# Patient Record
Sex: MALE | Race: WHITE | NOT HISPANIC OR LATINO | Employment: FULL TIME | URBAN - METROPOLITAN AREA
[De-identification: names, ages, dates, MRNs, and addresses within clinical notes are randomized per-mention and may not be internally consistent; named-entity substitution may affect disease eponyms.]

---

## 2020-07-25 ENCOUNTER — HOSPITAL ENCOUNTER (OUTPATIENT)
Dept: URGENT CARE | Facility: CLINIC | Age: 45
Discharge: HOME | End: 2020-07-25
Attending: PHYSICIAN ASSISTANT

## 2020-09-02 ENCOUNTER — OFFICE VISIT CONVERTED (OUTPATIENT)
Dept: FAMILY MEDICINE CLINIC | Facility: CLINIC | Age: 45
End: 2020-09-02
Attending: NURSE PRACTITIONER

## 2021-03-03 ENCOUNTER — HOSPITAL ENCOUNTER (OUTPATIENT)
Dept: FAMILY MEDICINE CLINIC | Facility: CLINIC | Age: 46
Discharge: HOME | End: 2021-03-03
Attending: NURSE PRACTITIONER

## 2021-03-03 ENCOUNTER — CONVERSION ENCOUNTER (OUTPATIENT)
Dept: FAMILY MEDICINE CLINIC | Facility: CLINIC | Age: 46
End: 2021-03-03

## 2021-03-03 ENCOUNTER — OFFICE VISIT CONVERTED (OUTPATIENT)
Dept: FAMILY MEDICINE CLINIC | Facility: CLINIC | Age: 46
End: 2021-03-03
Attending: NURSE PRACTITIONER

## 2021-03-03 LAB
ALBUMIN SERPL-MCNC: 4.5 G/DL (ref 3.5–5)
ALBUMIN/GLOB SERPL: 1.5 {RATIO} (ref 1.4–2.6)
ALP SERPL-CCNC: 62 U/L (ref 53–128)
ALT SERPL-CCNC: 20 U/L (ref 10–40)
ANION GAP SERPL CALC-SCNC: 17 MMOL/L (ref 8–19)
AST SERPL-CCNC: 22 U/L (ref 15–50)
BASOPHILS # BLD AUTO: 0.06 10*3/UL (ref 0–0.2)
BASOPHILS NFR BLD AUTO: 0.8 % (ref 0–3)
BILIRUB SERPL-MCNC: 0.38 MG/DL (ref 0.2–1.3)
BUN SERPL-MCNC: 11 MG/DL (ref 5–25)
BUN/CREAT SERPL: 9 {RATIO} (ref 6–20)
CALCIUM SERPL-MCNC: 9.6 MG/DL (ref 8.7–10.4)
CHLORIDE SERPL-SCNC: 102 MMOL/L (ref 99–111)
CHOLEST SERPL-MCNC: 328 MG/DL (ref 107–200)
CHOLEST/HDLC SERPL: 7.8 {RATIO} (ref 3–6)
CONV ABS IMM GRAN: 0.01 10*3/UL (ref 0–0.2)
CONV CO2: 26 MMOL/L (ref 22–32)
CONV IMMATURE GRAN: 0.1 % (ref 0–1.8)
CONV TOTAL PROTEIN: 7.6 G/DL (ref 6.3–8.2)
CREAT UR-MCNC: 1.17 MG/DL (ref 0.7–1.2)
DEPRECATED RDW RBC AUTO: 43.7 FL (ref 35.1–43.9)
EOSINOPHIL # BLD AUTO: 0.41 10*3/UL (ref 0–0.7)
EOSINOPHIL # BLD AUTO: 5.7 % (ref 0–7)
ERYTHROCYTE [DISTWIDTH] IN BLOOD BY AUTOMATED COUNT: 13.2 % (ref 11.6–14.4)
GFR SERPLBLD BASED ON 1.73 SQ M-ARVRAT: >60 ML/MIN/{1.73_M2}
GLOBULIN UR ELPH-MCNC: 3.1 G/DL (ref 2–3.5)
GLUCOSE SERPL-MCNC: 85 MG/DL (ref 70–99)
HCT VFR BLD AUTO: 48.3 % (ref 42–52)
HDLC SERPL-MCNC: 42 MG/DL (ref 40–60)
HGB BLD-MCNC: 16 G/DL (ref 14–18)
LDLC SERPL CALC-MCNC: 222 MG/DL (ref 70–100)
LYMPHOCYTES # BLD AUTO: 1.77 10*3/UL (ref 1–5)
LYMPHOCYTES NFR BLD AUTO: 24.7 % (ref 20–45)
MCH RBC QN AUTO: 30 PG (ref 27–31)
MCHC RBC AUTO-ENTMCNC: 33.1 G/DL (ref 33–37)
MCV RBC AUTO: 90.4 FL (ref 80–96)
MONOCYTES # BLD AUTO: 0.54 10*3/UL (ref 0.2–1.2)
MONOCYTES NFR BLD AUTO: 7.5 % (ref 3–10)
NEUTROPHILS # BLD AUTO: 4.39 10*3/UL (ref 2–8)
NEUTROPHILS NFR BLD AUTO: 61.2 % (ref 30–85)
NRBC CBCN: 0 % (ref 0–0.7)
OSMOLALITY SERPL CALC.SUM OF ELEC: 291 MOSM/KG (ref 273–304)
PLATELET # BLD AUTO: 250 10*3/UL (ref 130–400)
PMV BLD AUTO: 10 FL (ref 9.4–12.4)
POTASSIUM SERPL-SCNC: 4.4 MMOL/L (ref 3.5–5.3)
RBC # BLD AUTO: 5.34 10*6/UL (ref 4.7–6.1)
SODIUM SERPL-SCNC: 141 MMOL/L (ref 135–147)
T4 FREE SERPL-MCNC: 1.1 NG/DL (ref 0.9–1.8)
TRIGL SERPL-MCNC: 321 MG/DL (ref 40–150)
TSH SERPL-ACNC: 1.11 M[IU]/L (ref 0.27–4.2)
VLDLC SERPL-MCNC: 64 MG/DL (ref 5–37)
WBC # BLD AUTO: 7.18 10*3/UL (ref 4.8–10.8)

## 2021-05-10 NOTE — H&P
History and Physical      Patient Name: Subhash Barba   Patient ID: [id number]   Sex: Male   YOB: 1975        Visit Date: September 2, 2020    Provider: WENDY Burks   [place] [facility] [place] [address] [place] [address], [address]  [place], [place]  [id number]   Location Phone: [phone]          Chief Complaint  · establish care, new patient  · medication refills      History Of Present Illness  Subhash Barba is a 45 year old /White male who presents for evaluation and treatment of:      As a new patient today to establish care.  He states his previous provider recently retired.    History of anxiety: Stable on Zoloft 100 mg.  He states that he has been on this a long time and it works well for him.    History of GERD: States he is stable on over-the-counter Nexium.    History of asthma: He states he is well controlled and only use albuterol inhaler rarely as needed.    He states he was bit by a brown recluse spider about a month ago and was seen at the urgent care.  He states he was treated with an antibiotic and states that it has resolved.  He states he does still have a scar in the area but denies any pain or itching.    He states he has not had lab work for over a year now.  He states he does have a history of some elevated cholesterol but not requiring statin medication.  He is not fasting today but states he could come back another day for fasting labs.       Past Medical History  Disease Name Date Onset Notes   Allergies --  --    Anxiety --  --    Asthma --  --    Broken Bones --  --    Depression (emotion) --  --    GERD (gastroesophageal reflux disease) 09/02/2020 --    Head injuries --  --    High cholesterol --  --    Moderate mixed hyperlipidemia not requiring statin therapy 09/02/2020 --    Reflux Disease --  --          Medication List  Name Date Started Instructions   albuterol sulfate 90 mcg/actuation inhalation HFA aerosol inhaler   "inhale 1 - 2 puffs (90 - 180 mcg) by inhalation route every 6 hours as needed   Nexium 40 mg oral capsule,delayed release(DR/EC)  take 1 capsule (40 mg) by oral route once daily   Zoloft 100 mg oral tablet 09/02/2020 take 1 tablet (100 mg) by oral route once daily for 90 days         Family Medical History  Disease Name Relative/Age Notes   Diabetes Mother/   --          Social History  Finding Status Start/Stop Quantity Notes   Tobacco Former --/-- --  09/02/2020- quit in 2019, smoked for 10 years, 1/2 PPD         Review of Systems  · Constitutional  o Denies  o : fever, fatigue, weight loss, weight gain  · Cardiovascular  o Denies  o : lower extremity edema, claudication, chest pressure, palpitations  · Respiratory  o Denies  o : shortness of breath, wheezing, cough, hemoptysis, dyspnea on exertion  · Gastrointestinal  o Denies  o : nausea, vomiting, diarrhea, constipation, abdominal pain  · Genitourinary  o Denies  o : urgency, frequency  · Integument  o Denies  o : rash, itching  · Neurologic  o Denies  o : altered mental status, tingling or numbness  · Musculoskeletal  o Denies  o : joint pain, joint swelling  · Endocrine  o Denies  o : cold intolerance, central obesity  · Psychiatric  o Denies  o : anxiety, depression, difficulty sleeping, suicidal ideation, homicidal ideation  · Allergic-Immunologic  o Denies  o : sinus allergy symptoms, frequent illnesses      Vitals  Date Time BP Position Site L\R Cuff Size HR RR TEMP (F) WT  HT  BMI kg/m2 BSA m2 O2 Sat        09/02/2020 01:51 /82 Sitting    87 - R  97.8 200lbs 6oz 5'  8\" 30.47 2.09 93 %          Physical Examination  · Constitutional  o Appearance  o : no acute distress, well-nourished  · Head and Face  o Head  o :   § Inspection  § : atraumatic, normocephalic  · Neck  o Thyroid  o : gland size normal, nontender, no nodules or masses present on palpation, symmetric  · Respiratory  o Respiratory Effort  o : breathing comfortably, symmetric chest " rise  o Auscultation of Lungs  o : clear to asculatation bilaterally, no wheezes, rales, or rhonchii  · Cardiovascular  o Heart  o :   § Auscultation of Heart  § : regular rate and rhythm, no murmurs, rubs, or gallops  o Peripheral Vascular System  o :   § Extremities  § : no edema  · Lymphatic  o Neck  o : no lymphadenopathy present  · Skin and Subcutaneous Tissue  o General Inspection  o : Dark scar areas noted to right medial elbow, nontender on palpation.no rashes present, no lesions present  · Neurologic  o Mental Status Examination  o :   § Orientation  § : grossly oriented to person, place and time  o Gait and Station  o :   § Gait Screening  § : normal gait  · Psychiatric  o General  o : normal mood and affect  o Presence of Abnormal Thoughts  o : no hallucinations, no delusions present, no psychotic thoughts, no homicidal ideation, no suicidal ideation, no evidence of obsessional thinking          Assessment  · Anxiety disorder     300.00/F41.9  · GERD (gastroesophageal reflux disease)     530.81/K21.9  · Moderate mixed hyperlipidemia not requiring statin therapy     272.2/E78.2    Problems Reconciled  Plan  · Orders  o HTN/Lipid Panel (CMP, Lipid) [facility] (49786, [address]10) - 272.2/E78.2 - 09/08/2020   Patient will return for fasting labs next week  o CBC with Auto Diff [facility] (46122) - 272.2/E78.2, 300.00/F41.9, 530.81/K21.9 - 09/02/2020  o Thyroid Profile (90038, 56939, THYII) - 272.2/E78.2, 300.00/F41.9, 530.81/K21.9 - 09/02/2020  o ACO-39: Current medications updated and reviewed () - - 09/08/2020  o ACO-18: Negative screen for clinical depression using a standardized tool () - - 09/08/2020   negative 0 pts.  · Medications  o Zoloft 100 mg oral tablet   SIG: take 1 tablet (100 mg) by oral route once daily for 90 days   DISP: (90) tablet with 1 refills  Adjusted on 09/02/2020     o Medications have been Reconciled  o Transition of Care or Provider Policy  · Instructions  o Patient is taking medications  as prescribed and doing well.   o Patient was educated/instructed on their diagnosis, treatment and medications prior to discharge from the clinic today.  o Patient instructed to seek medical attention urgently for new or worsening symptoms.  o Call the office with any concerns or questions.  · Disposition  o Return to clinic in 6 months            Electronically Signed by: WENDY Burks -Author on September 2, 2020 02:13:17 PM

## 2021-05-14 VITALS
TEMPERATURE: 97.8 F | HEART RATE: 87 BPM | DIASTOLIC BLOOD PRESSURE: 82 MMHG | SYSTOLIC BLOOD PRESSURE: 124 MMHG | OXYGEN SATURATION: 93 % | HEIGHT: 68 IN | BODY MASS INDEX: 30.37 KG/M2 | WEIGHT: 200.37 LBS

## 2021-05-14 VITALS
HEART RATE: 80 BPM | DIASTOLIC BLOOD PRESSURE: 72 MMHG | HEIGHT: 68 IN | BODY MASS INDEX: 29.74 KG/M2 | TEMPERATURE: 98 F | SYSTOLIC BLOOD PRESSURE: 116 MMHG | OXYGEN SATURATION: 98 % | WEIGHT: 196.25 LBS

## 2021-05-14 NOTE — PROGRESS NOTES
Progress Note      Patient Name: Subhash Barba   Patient ID: [id number]   Sex: Male   YOB: 1975    Primary Care Provider: Marietta NGUYEN   Referring Provider: Marietta NGUYEN    Visit Date: March 3, 2021    Provider: WENDY Burks   [place] [facility] [place] Address[place] [address] [place] [address], [address] [place] [address]  [id number]   Location Phone: [phone]          Chief Complaint  · Annual physical  · Follow-up and med refills      History Of Present Illness  Subhash Barba is a 45 year old /White male who presents for evaluation and treatment of:      He is here for an annual physical, follow-up and med refills.    He states he is fasting today and wants to do some annual physical lab work.  He does have a history of mixed hyperlipidemia.    History of anxiety and depression: He is stable on Zoloft 100 mg once daily.    History of hiatal hernia with reflux: He has had an endoscopy in the past and was told he had a hiatal hernia.  He takes Nexium every day with good relief.    History of asthma: He is stable, states he rarely uses his albuterol inhaler, states usually only about twice per month.       Past Medical History  Disease Name Date Onset Notes   Allergies --  --    Anxiety --  --    Asthma --  --    Broken Bones --  --    Depression (emotion) --  --    GERD (gastroesophageal reflux disease) 09/02/2020 --    Head injuries --  --    High cholesterol --  --    Moderate mixed hyperlipidemia not requiring statin therapy 09/02/2020 --    Reflux Disease --  --          Medication List  Name Date Started Instructions   albuterol sulfate 90 mcg/actuation inhalation HFA aerosol inhaler  inhale 1 - 2 puffs (90 - 180 mcg) by inhalation route every 6 hours as needed   Nexium 40 mg oral capsule,delayed release(DR/EC)  take 1 capsule (40 mg) by oral route once daily   Zoloft 100 mg oral tablet 09/02/2020 take 1 tablet (100 mg) by oral route once  "daily for 90 days         Allergy List  Allergen Name Date Reaction Notes   NO KNOWN DRUG ALLERGIES --  --  --        Allergies Reconciled  Family Medical History  Disease Name Relative/Age Notes   Diabetes Mother/   --          Social History  Finding Status Start/Stop Quantity Notes   Tobacco Former --/-- --  09/02/2020- quit in 2019, smoked for 10 years, 1/2 PPD         Review of Systems  · Constitutional  o Denies  o : fever, fatigue, weight loss, weight gain  · Cardiovascular  o Denies  o : lower extremity edema, claudication, chest pressure, palpitations  · Respiratory  o Denies  o : shortness of breath, wheezing, cough, hemoptysis, dyspnea on exertion  · Gastrointestinal  o Admits  o : reflux  o Denies  o : nausea, vomiting, diarrhea, constipation, abdominal pain  · Genitourinary  o Denies  o : urgency, frequency  · Integument  o Denies  o : rash, itching  · Psychiatric  o Denies  o : anxiety, depression, suicidal ideation, homicidal ideation      Vitals  Date Time BP Position Site L\R Cuff Size HR RR TEMP (F) WT  HT  BMI kg/m2 BSA m2 O2 Sat FR L/min FiO2 HC       03/03/2021 10:02 /72 Sitting    80 - R  98 196lbs 4oz 5'  8\" 29.84 2.07 98 %            Physical Examination  · Constitutional  o Appearance  o : no acute distress, well-nourished  · Head and Face  o Head  o :   § Inspection  § : atraumatic, normocephalic  · Neck  o Thyroid  o : gland size normal, nontender, no nodules or masses present on palpation, symmetric  · Respiratory  o Respiratory Effort  o : breathing comfortably, symmetric chest rise  o Auscultation of Lungs  o : clear to asculatation bilaterally, no wheezes, rales, or rhonchii  · Cardiovascular  o Heart  o :   § Auscultation of Heart  § : regular rate and rhythm, no murmurs, rubs, or gallops  o Peripheral Vascular System  o :   § Extremities  § : no edema  · Lymphatic  o Neck  o : no lymphadenopathy present  · Neurologic  o Mental Status Examination  o :   § Orientation  § : " grossly oriented to person, place and time  o Gait and Station  o :   § Gait Screening  § : normal gait  · Psychiatric  o General  o : normal mood and affect  o Presence of Abnormal Thoughts  o : no hallucinations, no delusions present, no psychotic thoughts, no homicidal ideation, no suicidal ideation, no evidence of obsessional thinking              Assessment  · Annual physical exam     V70.0/Z00.00  We will check labs today, will call with results.  · Asthma     493.90/J45.909  Well-controlled, stable.  · Moderate mixed hyperlipidemia not requiring statin therapy     272.2/E78.2  · Anxiety     300.02/F41.1  Stable on Zoloft.  · Hiatal hernia with gastroesophageal reflux       Gastro-esophageal reflux disease without esophagitis     530.81/K21.9  Diaphragmatic hernia without obstruction or gangrene     530.81/K44.9  Stable on Nexium.      Plan  · Orders  o CBC with Auto Diff [facility] ([id number]) - 300.02/F41.1, 272.2/E78.2, V70.0/Z00.00 - 03/03/2021  o Thyroid Profile (61512, 06104, THYII) - 300.02/F41.1, 272.2/E78.2, V70.0/Z00.00 - 03/03/2021  o ACO-14: Influenza immunization was not administered for reasons documented [facility] () - - 03/03/2021   patient declines  o ACO-39: Current medications updated and reviewed (1159F, ) - - 03/03/2021  o HTN/Lipid Panel (CMP, Lipid) [facility] (49865, 37147) - 300.02/F41.1, 272.2/E78.2, V70.0/Z00.00 - 03/03/2021  · Medications  o Zoloft 100 mg oral tablet   SIG: take 1 tablet (100 mg) by oral route once daily for 90 days   DISP: (90) Tablet with 3 refills  Adjusted on 03/03/2021     o Medications have been Reconciled  o Transition of Care or Provider Policy  · Instructions  o Reviewed health maintenance flowsheet and updated information. Orders were placed and/or patient's response was documented.  o Patient was educated/instructed on their diagnosis, treatment and medications prior to discharge from the clinic today.  o Patient instructed to seek medical attention urgently for new or  worsening symptoms.  o Call the office with any concerns or questions.  · Disposition  o Return to clinic in 6 months            Electronically Signed by: WENDY Burks -Author on March 3, 2021 10:17:03 AM

## 2021-06-08 ENCOUNTER — OFFICE VISIT (OUTPATIENT)
Dept: FAMILY MEDICINE CLINIC | Facility: CLINIC | Age: 46
End: 2021-06-08

## 2021-06-08 VITALS
DIASTOLIC BLOOD PRESSURE: 80 MMHG | SYSTOLIC BLOOD PRESSURE: 116 MMHG | BODY MASS INDEX: 28.16 KG/M2 | HEIGHT: 68 IN | HEART RATE: 92 BPM | OXYGEN SATURATION: 98 % | TEMPERATURE: 98.6 F | WEIGHT: 185.8 LBS

## 2021-06-08 DIAGNOSIS — E78.2 MIXED HYPERLIPIDEMIA: ICD-10-CM

## 2021-06-08 DIAGNOSIS — N52.9 ERECTILE DYSFUNCTION, UNSPECIFIED ERECTILE DYSFUNCTION TYPE: ICD-10-CM

## 2021-06-08 DIAGNOSIS — R53.81 MALAISE AND FATIGUE: Primary | ICD-10-CM

## 2021-06-08 DIAGNOSIS — R79.89 LOW TESTOSTERONE IN MALE: ICD-10-CM

## 2021-06-08 DIAGNOSIS — G44.209 ACUTE NON INTRACTABLE TENSION-TYPE HEADACHE: ICD-10-CM

## 2021-06-08 DIAGNOSIS — R53.83 MALAISE AND FATIGUE: Primary | ICD-10-CM

## 2021-06-08 PROBLEM — K44.9 HIATAL HERNIA WITH GASTROESOPHAGEAL REFLUX: Status: ACTIVE | Noted: 2021-03-03

## 2021-06-08 PROBLEM — F41.9 ANXIETY: Status: ACTIVE | Noted: 2021-06-08

## 2021-06-08 PROBLEM — F32.A DEPRESSION: Status: ACTIVE | Noted: 2021-06-08

## 2021-06-08 PROBLEM — K21.9 HIATAL HERNIA WITH GASTROESOPHAGEAL REFLUX: Status: ACTIVE | Noted: 2021-03-03

## 2021-06-08 PROBLEM — J01.80 OTHER ACUTE SINUSITIS: Status: ACTIVE | Noted: 2021-06-08

## 2021-06-08 PROBLEM — J45.909 ASTHMA: Status: ACTIVE | Noted: 2021-06-08

## 2021-06-08 LAB
ALBUMIN SERPL-MCNC: 4.8 G/DL (ref 3.5–5.2)
ALBUMIN/GLOB SERPL: 1.6 G/DL
ALP SERPL-CCNC: 73 U/L (ref 39–117)
ALT SERPL W P-5'-P-CCNC: 19 U/L (ref 1–41)
ANION GAP SERPL CALCULATED.3IONS-SCNC: 12.7 MMOL/L (ref 5–15)
AST SERPL-CCNC: 18 U/L (ref 1–40)
BASOPHILS # BLD AUTO: 0.06 10*3/MM3 (ref 0–0.2)
BASOPHILS NFR BLD AUTO: 0.7 % (ref 0–1.5)
BILIRUB SERPL-MCNC: 0.5 MG/DL (ref 0–1.2)
BUN SERPL-MCNC: 8 MG/DL (ref 6–20)
BUN/CREAT SERPL: 8.1 (ref 7–25)
CALCIUM SPEC-SCNC: 9.9 MG/DL (ref 8.6–10.5)
CHLORIDE SERPL-SCNC: 101 MMOL/L (ref 98–107)
CHOLEST SERPL-MCNC: 181 MG/DL (ref 0–200)
CO2 SERPL-SCNC: 26.3 MMOL/L (ref 22–29)
CREAT SERPL-MCNC: 0.99 MG/DL (ref 0.76–1.27)
DEPRECATED RDW RBC AUTO: 44.3 FL (ref 37–54)
EOSINOPHIL # BLD AUTO: 0.48 10*3/MM3 (ref 0–0.4)
EOSINOPHIL NFR BLD AUTO: 5.7 % (ref 0.3–6.2)
ERYTHROCYTE [DISTWIDTH] IN BLOOD BY AUTOMATED COUNT: 13 % (ref 12.3–15.4)
FOLATE SERPL-MCNC: 10.4 NG/ML (ref 4.78–24.2)
GFR SERPL CREATININE-BSD FRML MDRD: 82 ML/MIN/1.73
GLOBULIN UR ELPH-MCNC: 3 GM/DL
GLUCOSE SERPL-MCNC: 78 MG/DL (ref 65–99)
HCT VFR BLD AUTO: 49.1 % (ref 37.5–51)
HDLC SERPL-MCNC: 48 MG/DL (ref 40–60)
HGB BLD-MCNC: 16.5 G/DL (ref 13–17.7)
IMM GRANULOCYTES # BLD AUTO: 0.02 10*3/MM3 (ref 0–0.05)
IMM GRANULOCYTES NFR BLD AUTO: 0.2 % (ref 0–0.5)
LDLC SERPL CALC-MCNC: 101 MG/DL (ref 0–100)
LDLC/HDLC SERPL: 2 {RATIO}
LYMPHOCYTES # BLD AUTO: 1.82 10*3/MM3 (ref 0.7–3.1)
LYMPHOCYTES NFR BLD AUTO: 21.8 % (ref 19.6–45.3)
MCH RBC QN AUTO: 31.1 PG (ref 26.6–33)
MCHC RBC AUTO-ENTMCNC: 33.6 G/DL (ref 31.5–35.7)
MCV RBC AUTO: 92.5 FL (ref 79–97)
MONOCYTES # BLD AUTO: 0.52 10*3/MM3 (ref 0.1–0.9)
MONOCYTES NFR BLD AUTO: 6.2 % (ref 5–12)
NEUTROPHILS NFR BLD AUTO: 5.45 10*3/MM3 (ref 1.7–7)
NEUTROPHILS NFR BLD AUTO: 65.4 % (ref 42.7–76)
NRBC BLD AUTO-RTO: 0 /100 WBC (ref 0–0.2)
PLATELET # BLD AUTO: 261 10*3/MM3 (ref 140–450)
PMV BLD AUTO: 10.1 FL (ref 6–12)
POTASSIUM SERPL-SCNC: 4.6 MMOL/L (ref 3.5–5.2)
PROT SERPL-MCNC: 7.8 G/DL (ref 6–8.5)
RBC # BLD AUTO: 5.31 10*6/MM3 (ref 4.14–5.8)
SODIUM SERPL-SCNC: 140 MMOL/L (ref 136–145)
T4 FREE SERPL-MCNC: 0.99 NG/DL (ref 0.93–1.7)
TRIGL SERPL-MCNC: 184 MG/DL (ref 0–150)
TSH SERPL DL<=0.05 MIU/L-ACNC: 0.94 UIU/ML (ref 0.27–4.2)
VIT B12 BLD-MCNC: 514 PG/ML (ref 211–946)
VLDLC SERPL-MCNC: 32 MG/DL (ref 5–40)
WBC # BLD AUTO: 8.35 10*3/MM3 (ref 3.4–10.8)

## 2021-06-08 PROCEDURE — 80053 COMPREHEN METABOLIC PANEL: CPT | Performed by: NURSE PRACTITIONER

## 2021-06-08 PROCEDURE — 84403 ASSAY OF TOTAL TESTOSTERONE: CPT | Performed by: NURSE PRACTITIONER

## 2021-06-08 PROCEDURE — 80061 LIPID PANEL: CPT | Performed by: NURSE PRACTITIONER

## 2021-06-08 PROCEDURE — 82607 VITAMIN B-12: CPT | Performed by: NURSE PRACTITIONER

## 2021-06-08 PROCEDURE — 84402 ASSAY OF FREE TESTOSTERONE: CPT | Performed by: NURSE PRACTITIONER

## 2021-06-08 PROCEDURE — 85025 COMPLETE CBC W/AUTO DIFF WBC: CPT | Performed by: NURSE PRACTITIONER

## 2021-06-08 PROCEDURE — 99214 OFFICE O/P EST MOD 30 MIN: CPT | Performed by: NURSE PRACTITIONER

## 2021-06-08 PROCEDURE — 96372 THER/PROPH/DIAG INJ SC/IM: CPT | Performed by: NURSE PRACTITIONER

## 2021-06-08 PROCEDURE — 84443 ASSAY THYROID STIM HORMONE: CPT | Performed by: NURSE PRACTITIONER

## 2021-06-08 PROCEDURE — 84439 ASSAY OF FREE THYROXINE: CPT | Performed by: NURSE PRACTITIONER

## 2021-06-08 PROCEDURE — 82746 ASSAY OF FOLIC ACID SERUM: CPT | Performed by: NURSE PRACTITIONER

## 2021-06-08 RX ORDER — KETOROLAC TROMETHAMINE 30 MG/ML
60 INJECTION, SOLUTION INTRAMUSCULAR; INTRAVENOUS ONCE
Status: COMPLETED | OUTPATIENT
Start: 2021-06-08 | End: 2021-06-08

## 2021-06-08 RX ORDER — ALBUTEROL SULFATE 90 UG/1
1-2 AEROSOL, METERED RESPIRATORY (INHALATION) EVERY 6 HOURS PRN
COMMUNITY
End: 2021-06-08 | Stop reason: SDUPTHER

## 2021-06-08 RX ORDER — ESOMEPRAZOLE MAGNESIUM 40 MG/1
1 CAPSULE, DELAYED RELEASE ORAL DAILY
COMMUNITY
End: 2021-09-22 | Stop reason: SDUPTHER

## 2021-06-08 RX ORDER — KETOROLAC TROMETHAMINE 30 MG/ML
60 INJECTION, SOLUTION INTRAMUSCULAR; INTRAVENOUS ONCE
Status: DISCONTINUED | OUTPATIENT
Start: 2021-06-08 | End: 2021-06-08

## 2021-06-08 RX ORDER — ALBUTEROL SULFATE 90 UG/1
1-2 AEROSOL, METERED RESPIRATORY (INHALATION) EVERY 6 HOURS PRN
Qty: 18 G | Refills: 5 | Status: SHIPPED | OUTPATIENT
Start: 2021-06-08 | End: 2021-07-09

## 2021-06-08 RX ORDER — ATORVASTATIN CALCIUM 40 MG/1
1 TABLET, FILM COATED ORAL NIGHTLY
COMMUNITY
Start: 2021-03-04 | End: 2021-11-04

## 2021-06-08 RX ORDER — SERTRALINE HYDROCHLORIDE 100 MG/1
100 TABLET, FILM COATED ORAL DAILY
COMMUNITY
Start: 2021-06-01 | End: 2021-09-03 | Stop reason: SDUPTHER

## 2021-06-08 RX ORDER — SILDENAFIL 50 MG/1
50 TABLET, FILM COATED ORAL DAILY PRN
Qty: 30 TABLET | Refills: 2 | Status: SHIPPED | OUTPATIENT
Start: 2021-06-08 | End: 2021-09-06

## 2021-06-08 RX ADMIN — KETOROLAC TROMETHAMINE 60 MG: 30 INJECTION, SOLUTION INTRAMUSCULAR; INTRAVENOUS at 11:58

## 2021-06-08 NOTE — PROGRESS NOTES
"Chief Complaint  Med Refill, Fatigue, and Headache    Subjective          Subhash Barba presents to [facility]  History of Present Illness  He presents today for a follow-up.  He is complaining of a headache that started a few weeks ago.  He states that his psychiatrist had put him on Strattera but then once he got up to the higher dose he was not able to tolerate the medicine so he had him stop the medication 1-2 weeks ago.  He states he has had a headache since stopping Strattera.  He describes the headache as constant in the posterior part of his head.  He states Tylenol does improve for a few hours but then it returns.    He is complaining of fatigue.  He also is complaining of erectile dysfunction and is wanting a prescription for Viagra.    Hyperlipidemia: He was started on Lipitor 3 months ago due to high LDL of 222.  His total cholesterol was 328, triglycerides 321, HDL 42.  He did not tolerate the 40 mg dose so we decreased to 20 mg and he has been tolerating that dose well.  He states he has been following a low-cholesterol diet also.  Objective   Vital Signs:   /80   Pulse 92   Temp 98.6 °F (37 °C)   Ht 172.7 cm (68\")   Wt 84.3 kg (185 lb 12.8 oz)   SpO2 98%   BMI 28.25 kg/m²     Physical Exam  Vitals reviewed.   Constitutional:       Appearance: Normal appearance. He is well-developed.   Neck:      Thyroid: No thyroid mass, thyromegaly or thyroid tenderness.   Cardiovascular:      Rate and Rhythm: Normal rate and regular rhythm.      Heart sounds: No murmur heard.   No friction rub. No gallop.    Pulmonary:      Effort: Pulmonary effort is normal.      Breath sounds: Normal breath sounds. No wheezing or rhonchi.   Musculoskeletal:      Cervical back: Normal range of motion and neck supple.   Lymphadenopathy:      Cervical: No cervical adenopathy.   Skin:     General: Skin is warm and dry.   Neurological:      Mental Status: He is alert and oriented to " person, place, and time.      Cranial Nerves: No cranial nerve deficit.   Psychiatric:         Mood and Affect: Mood and affect normal.         Behavior: Behavior normal.         Thought Content: Thought content normal. Thought content does not include homicidal or suicidal ideation.         Judgment: Judgment normal.        Result Review :                 Assessment and Plan    Diagnoses and all orders for this visit:    1. Malaise and fatigue (Primary)  -     Comprehensive Metabolic Panel  -     Lipid Panel  -     Vitamin B12 & Folate  -     CBC & Differential  -     TSH+Free T4  -     Testosterone, Free, Total    2. Mixed hyperlipidemia  -     Comprehensive Metabolic Panel  -     Lipid Panel  -     Vitamin B12 & Folate  -     CBC & Differential  -     TSH+Free T4    3. Acute non intractable tension-type headache  Comments:  I discussed with him that he is mostly having withdrawal from the Strattera.  We will give him Toradol 60 mg IM today for his headache.  Orders:  -     Discontinue: ketorolac (TORADOL) injection 60 mg  -     ketorolac (TORADOL) injection 60 mg    4. Erectile dysfunction, unspecified erectile dysfunction type  -     Testosterone, Free, Total    Other orders  -     albuterol sulfate  (90 Base) MCG/ACT inhaler; Inhale 1-2 puffs Every 6 (Six) Hours As Needed for Wheezing or Shortness of Air for up to 30 days.  Dispense: 18 g; Refill: 5  -     sildenafil (Viagra) 50 MG tablet; Take 1 tablet by mouth Daily As Needed for Erectile Dysfunction for up to 90 days.  Dispense: 30 tablet; Refill: 2        Follow Up   Return in about 6 months (around 12/8/2021) for Next scheduled follow up.  Patient was given instructions and counseling regarding his condition or for health maintenance advice. Please see specific information pulled into the AVS if appropriate.

## 2021-06-13 LAB
TESTOST FREE SERPL-MCNC: 4.5 PG/ML (ref 6.8–21.5)
TESTOST SERPL-MCNC: 267 NG/DL (ref 264–916)

## 2021-06-18 RX ORDER — SULFAMETHOXAZOLE AND TRIMETHOPRIM 800; 160 MG/1; MG/1
1 TABLET ORAL 2 TIMES DAILY
Qty: 20 TABLET | Refills: 0 | Status: SHIPPED | OUTPATIENT
Start: 2021-06-18 | End: 2021-06-28

## 2021-07-06 ENCOUNTER — LAB (OUTPATIENT)
Dept: FAMILY MEDICINE CLINIC | Facility: CLINIC | Age: 46
End: 2021-07-06

## 2021-07-06 DIAGNOSIS — N52.9 ERECTILE DYSFUNCTION, UNSPECIFIED ERECTILE DYSFUNCTION TYPE: ICD-10-CM

## 2021-07-06 DIAGNOSIS — R53.83 MALAISE AND FATIGUE: ICD-10-CM

## 2021-07-06 DIAGNOSIS — R79.89 LOW TESTOSTERONE IN MALE: ICD-10-CM

## 2021-07-06 DIAGNOSIS — R53.81 MALAISE AND FATIGUE: ICD-10-CM

## 2021-07-06 PROCEDURE — 36415 COLL VENOUS BLD VENIPUNCTURE: CPT | Performed by: NURSE PRACTITIONER

## 2021-07-06 PROCEDURE — 84403 ASSAY OF TOTAL TESTOSTERONE: CPT | Performed by: NURSE PRACTITIONER

## 2021-07-06 PROCEDURE — 84402 ASSAY OF FREE TESTOSTERONE: CPT | Performed by: NURSE PRACTITIONER

## 2021-07-08 ENCOUNTER — TELEPHONE (OUTPATIENT)
Dept: FAMILY MEDICINE CLINIC | Facility: CLINIC | Age: 46
End: 2021-07-08

## 2021-07-08 NOTE — TELEPHONE ENCOUNTER
Caller: Subhash Barba    Relationship to patient: Self    Best call back number: [phone]    Chief complaint: RINGING IN EARS, JOINTS HURTING, PULLED TICK OFF A FEW DAYS AGO, ITCHING AND SWELLING AROUND TICK BITE    Type of visit: OFFICE VISIT    Additional notes: PATIENT BIT BY TICK A FEW DAYS AGO. NO APPTS AVAILABLE FOR A WEEK. ATTEMPTED TO WARM TRANSFER. PLEASE CALL PATIENT TO SCHEDULE OR ADVISE.

## 2021-07-09 PROCEDURE — 86757 RICKETTSIA ANTIBODY: CPT | Performed by: FAMILY MEDICINE

## 2021-07-09 PROCEDURE — 86666 EHRLICHIA ANTIBODY: CPT | Performed by: FAMILY MEDICINE

## 2021-07-09 PROCEDURE — 86618 LYME DISEASE ANTIBODY: CPT | Performed by: FAMILY MEDICINE

## 2021-07-10 LAB
TESTOST FREE SERPL-MCNC: 8.2 PG/ML (ref 6.8–21.5)
TESTOST SERPL-MCNC: 307 NG/DL (ref 264–916)

## 2021-07-19 ENCOUNTER — OFFICE VISIT (OUTPATIENT)
Dept: FAMILY MEDICINE CLINIC | Facility: CLINIC | Age: 46
End: 2021-07-19

## 2021-07-19 VITALS
DIASTOLIC BLOOD PRESSURE: 72 MMHG | WEIGHT: 186.8 LBS | HEIGHT: 68 IN | TEMPERATURE: 97.1 F | BODY MASS INDEX: 28.31 KG/M2 | SYSTOLIC BLOOD PRESSURE: 118 MMHG | HEART RATE: 84 BPM | OXYGEN SATURATION: 99 %

## 2021-07-19 DIAGNOSIS — R79.89 LOW TESTOSTERONE IN MALE: ICD-10-CM

## 2021-07-19 DIAGNOSIS — R06.83 SNORING: ICD-10-CM

## 2021-07-19 DIAGNOSIS — R53.83 FATIGUE, UNSPECIFIED TYPE: Primary | ICD-10-CM

## 2021-07-19 PROCEDURE — 99213 OFFICE O/P EST LOW 20 MIN: CPT | Performed by: NURSE PRACTITIONER

## 2021-07-19 NOTE — PROGRESS NOTES
"Chief Complaint  Fatigue    Subjective          Subhash Barba presents to [facility]  History of Present Illness  He is here to follow-up to discuss fatigue and low testosterone levels.  He had his testosterone level checked twice in the first time was low but the second time was just low normal.  He continues to complain of fatigue.  He states his wife states that he does snore.  Objective   Vital Signs:   /72   Pulse 84   Temp 97.1 °F (36.2 °C)   Ht 172.7 cm (68\")   Wt 84.7 kg (186 lb 12.8 oz)   SpO2 99%   BMI 28.40 kg/m²     Physical Exam  Vitals reviewed.   Constitutional:       Appearance: Normal appearance. He is well-developed.   Neck:      Thyroid: No thyroid mass, thyromegaly or thyroid tenderness.   Cardiovascular:      Rate and Rhythm: Normal rate and regular rhythm.      Heart sounds: No murmur heard.   No friction rub. No gallop.    Pulmonary:      Effort: Pulmonary effort is normal.      Breath sounds: Normal breath sounds. No wheezing or rhonchi.   Lymphadenopathy:      Cervical: No cervical adenopathy.   Skin:     General: Skin is warm and dry.   Neurological:      Mental Status: He is alert and oriented to person, place, and time.      Cranial Nerves: No cranial nerve deficit.   Psychiatric:         Mood and Affect: Mood and affect normal.         Behavior: Behavior normal.         Thought Content: Thought content normal. Thought content does not include homicidal or suicidal ideation.         Judgment: Judgment normal.        Result Review :                 Assessment and Plan    Diagnoses and all orders for this visit:    1. Fatigue, unspecified type (Primary)  -     Ambulatory Referral to Sleep Medicine  -     Ambulatory Referral to Endocrinology    2. Low testosterone in male  -     Ambulatory Referral to Endocrinology    3. Snoring  -     Ambulatory Referral to Sleep Medicine        Follow Up   No follow-ups on file.  Patient was given " instructions and counseling regarding his condition or for health maintenance advice. Please see specific information pulled into the AVS if appropriate.

## 2021-09-02 NOTE — PROGRESS NOTES
"Chief Complaint  Hyperlipidemia, Depression, and Anxiety    Subjective          Subhash Barba presents to [facility]  History of Present Illness   He is here for a 6-month follow-up.    Mixed hyperlipidemia: His labs on 3/3/2021 showed total cholesterol 328, triglycerides 321, HDL 42, .  He was started on atorvastatin 40 mg once daily but he did not tolerate the medicine so we decreased the dose to 20 mg.  He was also following a low-cholesterol diet.  His lipid panel was rechecked on 6/8/2021 and was much improved.  Total cholesterol 181, triglycerides 184, HDL 48, .    Erectile dysfunction: He has requested Viagra on his last visit in June which he states has helped.  He was also complaining of fatigue.  Testosterone levels were checked which were low normal. Thyroid levels were also checked and they were normal.  He states he has an appointment with the endocrinologist.    Anxiety and depression: He is currently stable on Zoloft 100 mg once daily.    History of hiatal hernia with reflux: He had endoscopy in the past and was told he has a hiatal hernia.  He takes Nexium every day with good relief.    History of allergies and asthma: he has been using albuterol inhaler more frequently in the am.  He is complaining of increased allergy symptoms for a few months. He takes benadryl daily and sometimes takes sudafed. He has tried multiple antihistamines in the past without good relief.  He states he used to get allergy injections but they stopped working so he is not getting those anymore.    Patient was also seen by Sona Pace, NP student.  Which is good they know to have a little break from also a one-time when you need to get sent down the distal unit 915 together  11-year-old here today  Objective   Vital Signs:   /72   Pulse 62   Temp 97.4 °F (36.3 °C)   Ht 172.7 cm (68\")   Wt 85.5 kg (188 lb 9.6 oz)   SpO2 100%   BMI 28.68 kg/m²     Physical " Exam  Vitals reviewed.   Constitutional:       Appearance: Normal appearance. He is well-developed.   Neck:      Thyroid: No thyroid mass, thyromegaly or thyroid tenderness.   Cardiovascular:      Rate and Rhythm: Normal rate and regular rhythm.      Heart sounds: No murmur heard.   No friction rub. No gallop.    Pulmonary:      Effort: Pulmonary effort is normal.      Breath sounds: Normal breath sounds. No wheezing or rhonchi.   Lymphadenopathy:      Cervical: No cervical adenopathy.   Skin:     General: Skin is warm and dry.   Neurological:      Mental Status: He is alert and oriented to person, place, and time.      Cranial Nerves: No cranial nerve deficit.   Psychiatric:         Mood and Affect: Mood and affect normal.         Behavior: Behavior normal.         Thought Content: Thought content normal. Thought content does not include homicidal or suicidal ideation.         Judgment: Judgment normal.        Result Review :                 Assessment and Plan    Diagnoses and all orders for this visit:    1. Mild persistent asthma without complication (Primary)  Assessment & Plan:  Asthma is worsening.  The patient is experiencing frequent daytime asthma symptoms. He is experiencing no nighttime asthma symptoms.  Asthma information handout given.  Medications: continue Albuterol inhaler as needed and begin Singulair 10 mg nightly.        Orders:  -     triamcinolone acetonide (KENALOG-40) injection 40 mg    2. Allergic rhinitis, unspecified seasonality, unspecified trigger  Assessment & Plan:  Allergies not well controlled, will give him a Kenalog 40 mg IM injection today.  We will also start him on Singulair 10 mg nightly.    Orders:  -     triamcinolone acetonide (KENALOG-40) injection 40 mg    3. Mixed hyperlipidemia  Assessment & Plan:  Lipid abnormalities are improving with treatment.  Pharmacotherapy as ordered.  Lipids will be reassessed Today.    Orders:  -     Comprehensive Metabolic Panel  -     Lipid  Panel    4. Erectile dysfunction, unspecified erectile dysfunction type  Assessment & Plan:  Improving with Viagra.      5. Anxiety and depression  Assessment & Plan:  Patient's depression is single episode and is mild without psychosis. Their depression is currently active and the condition is improving with treatment. This will be reassessed at the next regular appointment. F/U as described:patient will continue current medication therapy.      Other orders  -     montelukast (Singulair) 10 MG tablet; Take 1 tablet by mouth Every Night.  Dispense: 90 tablet; Refill: 3  -     sertraline (ZOLOFT) 100 MG tablet; Take 1 tablet by mouth Daily.  Dispense: 90 tablet; Refill: 3      Follow Up   Return in about 6 months (around 3/3/2022) for Next scheduled follow up.  Patient was given instructions and counseling regarding his condition or for health maintenance advice. Please see specific information pulled into the AVS if appropriate.

## 2021-09-03 ENCOUNTER — OFFICE VISIT (OUTPATIENT)
Dept: FAMILY MEDICINE CLINIC | Facility: CLINIC | Age: 46
End: 2021-09-03

## 2021-09-03 VITALS
HEIGHT: 68 IN | TEMPERATURE: 97.4 F | OXYGEN SATURATION: 100 % | WEIGHT: 188.6 LBS | DIASTOLIC BLOOD PRESSURE: 72 MMHG | SYSTOLIC BLOOD PRESSURE: 112 MMHG | HEART RATE: 62 BPM | BODY MASS INDEX: 28.58 KG/M2

## 2021-09-03 DIAGNOSIS — F41.9 ANXIETY AND DEPRESSION: ICD-10-CM

## 2021-09-03 DIAGNOSIS — J45.30 MILD PERSISTENT ASTHMA WITHOUT COMPLICATION: Primary | ICD-10-CM

## 2021-09-03 DIAGNOSIS — J30.9 ALLERGIC RHINITIS, UNSPECIFIED SEASONALITY, UNSPECIFIED TRIGGER: ICD-10-CM

## 2021-09-03 DIAGNOSIS — N52.9 ERECTILE DYSFUNCTION, UNSPECIFIED ERECTILE DYSFUNCTION TYPE: ICD-10-CM

## 2021-09-03 DIAGNOSIS — F32.A ANXIETY AND DEPRESSION: ICD-10-CM

## 2021-09-03 DIAGNOSIS — E78.2 MIXED HYPERLIPIDEMIA: ICD-10-CM

## 2021-09-03 LAB
ALBUMIN SERPL-MCNC: 4.7 G/DL (ref 3.5–5.2)
ALBUMIN/GLOB SERPL: 1.7 G/DL
ALP SERPL-CCNC: 70 U/L (ref 39–117)
ALT SERPL W P-5'-P-CCNC: 14 U/L (ref 1–41)
ANION GAP SERPL CALCULATED.3IONS-SCNC: 9.3 MMOL/L (ref 5–15)
AST SERPL-CCNC: 18 U/L (ref 1–40)
BILIRUB SERPL-MCNC: 0.5 MG/DL (ref 0–1.2)
BUN SERPL-MCNC: 8 MG/DL (ref 6–20)
BUN/CREAT SERPL: 7.8 (ref 7–25)
CALCIUM SPEC-SCNC: 9.6 MG/DL (ref 8.6–10.5)
CHLORIDE SERPL-SCNC: 102 MMOL/L (ref 98–107)
CHOLEST SERPL-MCNC: 183 MG/DL (ref 0–200)
CO2 SERPL-SCNC: 27.7 MMOL/L (ref 22–29)
CREAT SERPL-MCNC: 1.03 MG/DL (ref 0.76–1.27)
GFR SERPL CREATININE-BSD FRML MDRD: 78 ML/MIN/1.73
GLOBULIN UR ELPH-MCNC: 2.8 GM/DL
GLUCOSE SERPL-MCNC: 73 MG/DL (ref 65–99)
HDLC SERPL-MCNC: 43 MG/DL (ref 40–60)
LDLC SERPL CALC-MCNC: 99 MG/DL (ref 0–100)
LDLC/HDLC SERPL: 2.13 {RATIO}
POTASSIUM SERPL-SCNC: 4.3 MMOL/L (ref 3.5–5.2)
PROT SERPL-MCNC: 7.5 G/DL (ref 6–8.5)
SODIUM SERPL-SCNC: 139 MMOL/L (ref 136–145)
TRIGL SERPL-MCNC: 241 MG/DL (ref 0–150)
VLDLC SERPL-MCNC: 41 MG/DL (ref 5–40)

## 2021-09-03 PROCEDURE — 80053 COMPREHEN METABOLIC PANEL: CPT | Performed by: NURSE PRACTITIONER

## 2021-09-03 PROCEDURE — 99214 OFFICE O/P EST MOD 30 MIN: CPT | Performed by: NURSE PRACTITIONER

## 2021-09-03 PROCEDURE — 80061 LIPID PANEL: CPT | Performed by: NURSE PRACTITIONER

## 2021-09-03 PROCEDURE — 96372 THER/PROPH/DIAG INJ SC/IM: CPT | Performed by: NURSE PRACTITIONER

## 2021-09-03 RX ORDER — SERTRALINE HYDROCHLORIDE 100 MG/1
100 TABLET, FILM COATED ORAL DAILY
Qty: 90 TABLET | Refills: 3 | Status: SHIPPED | OUTPATIENT
Start: 2021-09-03 | End: 2022-11-04 | Stop reason: ALTCHOICE

## 2021-09-03 RX ORDER — TRIAMCINOLONE ACETONIDE 40 MG/ML
40 INJECTION, SUSPENSION INTRA-ARTICULAR; INTRAMUSCULAR ONCE
Status: COMPLETED | OUTPATIENT
Start: 2021-09-03 | End: 2021-09-03

## 2021-09-03 RX ORDER — ALBUTEROL SULFATE 90 UG/1
AEROSOL, METERED RESPIRATORY (INHALATION)
COMMUNITY
Start: 2021-08-31 | End: 2022-11-04 | Stop reason: SDUPTHER

## 2021-09-03 RX ORDER — MONTELUKAST SODIUM 10 MG/1
10 TABLET ORAL NIGHTLY
Qty: 90 TABLET | Refills: 3 | Status: SHIPPED | OUTPATIENT
Start: 2021-09-03

## 2021-09-03 RX ADMIN — TRIAMCINOLONE ACETONIDE 40 MG: 40 INJECTION, SUSPENSION INTRA-ARTICULAR; INTRAMUSCULAR at 10:37

## 2021-09-03 NOTE — ASSESSMENT & PLAN NOTE
Asthma is worsening.  The patient is experiencing frequent daytime asthma symptoms. He is experiencing no nighttime asthma symptoms.  Asthma information handout given.  Medications: continue Albuterol inhaler as needed and begin Singulair 10 mg nightly.

## 2021-09-03 NOTE — ASSESSMENT & PLAN NOTE
Allergies not well controlled, will give him a Kenalog 40 mg IM injection today.  We will also start him on Singulair 10 mg nightly.

## 2021-09-03 NOTE — ASSESSMENT & PLAN NOTE
Lipid abnormalities are improving with treatment.  Pharmacotherapy as ordered.  Lipids will be reassessed Today.

## 2021-09-03 NOTE — ASSESSMENT & PLAN NOTE
Patient's depression is single episode and is mild without psychosis. Their depression is currently active and the condition is improving with treatment. This will be reassessed at the next regular appointment. F/U as described:patient will continue current medication therapy.

## 2021-09-03 NOTE — PATIENT INSTRUCTIONS
"http://www.aaaai.org/conditions-and-treatments/[email or web address]">   Asthma, Adult    Asthma is a long-term (chronic) condition that causes recurrent episodes in which the airways become tight and narrow. The airways are the passages that lead from the nose and mouth down into the lungs. Asthma episodes, also called asthma attacks, can cause coughing, wheezing, shortness of breath, and chest pain. The airways can also fill with mucus. During an attack, it can be difficult to breathe. Asthma attacks can range from minor to life threatening.  Asthma cannot be cured, but medicines and lifestyle changes can help control it and treat acute attacks.  What are the causes?  This condition is believed to be caused by inherited (genetic) and environmental factors, but its exact cause is not known.  There are many things that can bring on an asthma attack or make asthma symptoms worse (triggers). Asthma triggers are different for each person. Common triggers include:  · Mold.  · Dust.  · Cigarette smoke.  · Cockroaches.  · Things that can cause allergy symptoms (allergens), such as animal dander or pollen from trees or grass.  · Air pollutants such as household , wood smoke, smog, or chemical odors.  · Cold air, weather changes, and winds (which increase molds and pollen in the air).  · Strong emotional expressions such as crying or laughing hard.  · Stress.  · Certain medicines (such as aspirin) or types of medicines (such as beta-blockers).  · Sulfites in foods and drinks. Foods and drinks that may contain sulfites include dried fruit, potato chips, and sparkling grape juice.  · Infections or inflammatory conditions such as the flu, a cold, or inflammation of the nasal membranes (rhinitis).  · Gastroesophageal reflux disease (GERD).  · Exercise or strenuous activity.  What are the signs or symptoms?  Symptoms of this condition may occur right after asthma is triggered or many hours later. Symptoms include:  · Wheezing. This can " sound like whistling when you breathe.  · Excessive nighttime or early morning coughing.  · Frequent or severe coughing with a common cold.  · Chest tightness.  · Shortness of breath.  · Tiredness (fatigue) with minimal activity.  How is this diagnosed?  This condition is diagnosed based on:  · Your medical history.  · A physical exam.  · Tests, which may include:  ? Lung function studies and pulmonary studies (spirometry). These tests can evaluate the flow of air in your lungs.  ? Allergy tests.  ? Imaging tests, such as X-rays.  How is this treated?  There is no cure for this condition, but treatment can help control your symptoms. Treatment for asthma usually involves:  · Identifying and avoiding your asthma triggers.  · Using medicines to control your symptoms. Generally, two types of medicines are used to treat asthma:  ? Controller medicines. These help prevent asthma symptoms from occurring. They are usually taken every day.  ? Fast-acting reliever or rescue medicines. These quickly relieve asthma symptoms by widening the narrow and tight airways. They are used as needed and provide short-term relief.  · Using supplemental oxygen. This may be needed during a severe episode.  · Using other medicines, such as:  ? Allergy medicines, such as antihistamines, if your asthma attacks are triggered by allergens.  ? Immune medicines (immunomodulators). These are medicines that help control the immune system.  · Creating an asthma action plan. An asthma action plan is a written plan for managing and treating your asthma attacks. This plan includes:  ? A list of your asthma triggers and how to avoid them.  ? Information about when medicines should be taken and when their dosage should be changed.  ? Instructions about using a device called a peak flow meter. A peak flow meter measures how well the lungs are working and the severity of your asthma. It helps you monitor your condition.  Follow these instructions at  home:  Controlling your home environment  Control your home environment in the following ways to help avoid triggers and prevent asthma attacks:  · Change your heating and air conditioning filter regularly.  · Limit your use of fireplaces and wood stoves.  · Get rid of pests (such as roaches and mice) and their droppings.  · Throw away plants if you see mold on them.  · Clean floors and dust surfaces regularly. Use unscented cleaning products.  · Try to have someone else vacuum for you regularly. Stay out of rooms while they are being vacuumed and for a short while afterward. If you vacuum, use a dust mask from a hardware store, a double-layered or microfilter vacuum  bag, or a vacuum  with a HEPA filter.  · Replace carpet with wood, tile, or vinyl siria. Carpet can trap dander and dust.  · Use allergy-proof pillows, mattress covers, and box spring covers.  · Keep your bedroom a trigger-free room.  · Avoid pets and keep windows closed when allergens are in the air.  · Wash beddings every week in hot water and dry them in a dryer.  · Use blankets that are made of polyester or cotton.  · Clean bathrooms and mauricio with bleach. If possible, have someone repaint the walls in these rooms with mold-resistant paint. Stay out of the rooms that are being cleaned and painted.  · Wash your hands often with soap and water. If soap and water are not available, use hand .  · Do not allow anyone to smoke in your home.  General instructions  · Take over-the-counter and prescription medicines only as told by your health care provider.  ? Speak with your health care provider if you have questions about how or when to take the medicines.  ? Make note if you are requiring more frequent dosages.  · Do not use any products that contain nicotine or tobacco, such as cigarettes and e-cigarettes. If you need help quitting, ask your health care provider. Also, avoid being exposed to secondhand smoke.  · Use a peak  flow meter as told by your health care provider. Record and keep track of the readings.  · Understand and use the asthma action plan to help minimize, or stop an asthma attack, without needing to seek medical care.  · Make sure you stay up to date on your yearly vaccinations as told by your health care provider. This may include vaccines for the flu and pneumonia.  · Avoid outdoor activities when allergen counts are high and when air quality is low.  · Wear a ski mask that covers your nose and mouth during outdoor winter activities. Exercise indoors on cold days if you can.  · Warm up before exercising, and take time for a cool-down period after exercise.  · Keep all follow-up visits as told by your health care provider. This is important.  Where to find more information  · For information about asthma, turn to the Centers for Disease Control and Prevention at www.cdc.gov/asthma/faqs.htm  · For air quality information, turn to [employer]Now at https://airnow.gov/  Contact a health care provider if:  · You have wheezing, shortness of breath, or a cough even while you are taking medicine to prevent attacks.  · The mucus you cough up (sputum) is thicker than usual.  · Your sputum changes from clear or white to yellow, green, gray, or bloody.  · Your medicines are causing side effects, such as a rash, itching, swelling, or trouble breathing.  · You need to use a reliever medicine more than 2-3 times a week.  · Your peak flow reading is still at 50-79% of your personal best after following your action plan for 1 hour.  · You have a fever.  Get help right away if:  · You are getting worse and do not respond to treatment during an asthma attack.  · You are short of breath when at rest or when doing very little physical activity.  · You have difficulty eating, drinking, or talking.  · You have chest pain or tightness.  · You develop a fast heartbeat or palpitations.  · You have a bluish color to your lips or fingernails.  · You  are light-headed or dizzy, or you faint.  · Your peak flow reading is less than 50% of your personal best.  · You feel too tired to breathe normally.  Summary  · Asthma is a long-term (chronic) condition that causes recurrent episodes in which the airways become tight and narrow. These episodes can cause coughing, wheezing, shortness of breath, and chest pain.  · Asthma cannot be cured, but medicines and lifestyle changes can help control it and treat acute attacks.  · Make sure you understand how to avoid triggers and how and when to use your medicines.  · Asthma attacks can range from minor to life threatening. Get help right away if you have an asthma attack and do not respond to treatment with your usual rescue medicines.  This information is not intended to replace advice given to you by your health care provider. Make sure you discuss any questions you have with your health care provider.  Document Revised: 02/20/2020 Document Reviewed: 01/22/2018  [employer] Patient Education © 2021 [employer]    Allergic Rhinitis, Adult    Allergic rhinitis is an allergic reaction that affects the mucous membrane inside the nose. The mucous membrane is the tissue that produces mucus.  There are two types of allergic rhinitis:  · Seasonal. This type is also called hay fever and happens only during certain seasons.  · Perennial. This type can happen at any time of the year.  Allergic rhinitis cannot be spread from person to person. This condition can be mild, moderate, or severe. It can develop at any age and may be outgrown.  What are the causes?  This condition is caused by allergens. These are things that can cause an allergic reaction. Allergens may differ for seasonal allergic rhinitis and perennial allergic rhinitis.  · Seasonal allergic rhinitis is triggered by pollen. Pollen can come from grasses, trees, and weeds.  · Perennial allergic rhinitis may be triggered by:  ? Dust mites.  ? Proteins in a pet's urine, saliva,  or dander. Dander is dead skin cells from a pet.  ? Smoke, mold, or car fumes.  What increases the risk?  You are more likely to develop this condition if you have a family history of allergies or other conditions related to allergies, including:  · Allergic conjunctivitis. This is inflammation of parts of the eyes and eyelids.  · Asthma. This condition affects the lungs and makes it hard to breathe.  · Atopic dermatitis or eczema. This is long term (chronic) inflammation of the skin.  · Food allergies.  What are the signs or symptoms?  Symptoms of this condition include:  · Sneezing or coughing.  · A stuffy nose (nasal congestion), itchy nose, or nasal discharge.  · Itchy eyes and tearing of the eyes.  · A feeling of mucus dripping down the back of your throat (postnasal drip).  · Trouble sleeping.  · Tiredness or fatigue.  · Headache.  · Sore throat.  How is this diagnosed?  This condition may be diagnosed with your symptoms, medical history, and physical exam. Your health care provider may check for related conditions, such as:  · Asthma.  · Pink eye. This is eye inflammation caused by infection (conjunctivitis).  · Ear infection.  · Upper respiratory infection. This is an infection in the nose, throat, or upper airways.  You may also have tests to find out which allergens trigger your symptoms. These may include skin tests or blood tests.  How is this treated?  There is no cure for this condition, but treatment can help control symptoms. Treatment may include:  · Taking medicines that block allergy symptoms, such as corticosteroids and antihistamines. Medicine may be given as a shot, nasal spray, or pill.  · Avoiding any allergens.  · Being exposed again and again to tiny amounts of allergens to help you build a defense against allergens (immunotherapy). This is done if other treatments have not helped. It may include:  ? Allergy shots. These are injected medicines that have small amounts of allergen in  them.  ? Sublingual immunotherapy. This involves taking small doses of a medicine with allergen in it under your tongue.  If these treatments do not work, your health care provider may prescribe newer, stronger medicines.  Follow these instructions at home:  Avoiding allergens  Find out what you are allergic to and avoid those allergens. These are some things you can do to help avoid allergens:  · If you have perennial allergies:  ? Replace carpet with wood, tile, or vinyl siria. Carpet can trap dander and dust.  ? Do not smoke. Do not allow smoking in your home.  ? Change your heating and air conditioning filters at least once a month.  · If you have seasonal allergies, take these steps during allergy season:  ? Keep windows closed as much as possible.  ? Plan outdoor activities when pollen counts are lowest. Check pollen counts before you plan outdoor activities.  ? When coming indoors, change clothing and shower before sitting on furniture or bedding.  · If you have a pet in the house that produces allergens:  ? Keep the pet out of the bedroom.  ? Vacuum, sweep, and dust regularly.  General instructions  · Take over-the-counter and prescription medicines only as told by your health care provider.  · Drink enough fluid to keep your urine pale yellow.  · Keep all follow-up visits as told by your health care provider. This is important.  Where to find more information  · American Academy of Allergy, Asthma & Immunology: www.aaaai.org  Contact a health care provider if:  · You have a fever.  · You develop a cough that does not go away.  · You make whistling sounds when you breathe (wheeze).  · Your symptoms slow you down or stop you from doing your normal activities each day.  Get help right away if:  · You have shortness of breath.  This symptom may represent a serious problem that is an emergency. Do not wait to see if the symptom will go away. Get medical help right away. Call your local emergency services (916  in the [place] Do not drive yourself to the hospital.  Summary  · Allergic rhinitis may be managed by taking medicines as directed and avoiding allergens.  · If you have seasonal allergies, keep windows closed as much as possible during allergy season.  · Contact your health care provider if you develop a fever or a cough that does not go away.  This information is not intended to replace advice given to you by your health care provider. Make sure you discuss any questions you have with your health care provider.  Document Revised: 02/05/2021 Document Reviewed: 12/15/2020  [email or web address] Patient Education © 2021 [email or web address]    Asthma Attack    Asthma attack, also called acute bronchospasm, is the sudden narrowing and tightening of the air passages, which limits the amount of oxygen that can get into the lungs. The narrowing is caused by inflammation and tightening of the muscles in the air tubes (bronchi) in the lungs.  Too much mucus is also produced, which narrows the airways more. This can cause trouble breathing, loud breathing (wheezing), and coughing. The goal of treatment is to open the airways in the lungs and reduce inflammation.  What are the causes?  Possible causes or triggers of this condition include:  · Animal dander, dust mites, or cockroaches.  · Mold, pollen from trees or grass, or cold air.  · Air pollutants such as dust, household , aerosol sprays, strong chemicals, strong odors, and smoke of any kind.  · Stress or strong emotions such as crying or laughing hard.  · Exercise or activity that requires a lot of energy.  · Substances in foods and drinks, such as dried fruits and wine, called sulfites.  · Certain medicines or medical conditions such as:  ? Aspirin or beta-blockers.  ? Infections or inflammatory conditions, such as a flu (influenza), a cold, pneumonia, or inflammation of the nasal membranes (rhinitis).  ? Gastroesophageal reflux disease (GERD). GERD is a condition in which stomach  acid backs up into your esophagus and spills into your trachea (windpipe), which can irritate your airways.  What are the signs or symptoms?  Symptoms of this condition include:  · Wheezing. This may sound like whistling while breathing. This may only happen at night.  · Excessive coughing. This may only happen at night.  · Chest tightness or pain.  · Shortness of breath.  · Feeling like you cannot get enough air no matter how hard you breathe (air hunger).  How is this diagnosed?  This condition may be diagnosed based on:  · Your medical history.  · Your symptoms.  · A physical exam.  · Tests to check for other causes of your symptoms or other conditions that may have triggered your asthma attack. These tests may include:  ? A chest X-ray.  ? Blood tests.  ? Tests to assess lung function, such as breathing into a device that measures how much air you can inhale and exhale (spirometry).  How is this treated?  Treatment for this condition depends on the severity and cause of your asthma attack.  · For mild attacks, you may receive medicines through a hand-held inhaler (metered dose inhaler, or MDI) or through a device that turns liquid medicine into a mist (nebulizer). These medicines include:  ? Quick relief or rescue medicines that quickly relax the airways and lungs.  ? Long-acting medicines that are used daily to prevent (control) your asthma symptoms.  · For moderate or severe attacks, you may be treated with steroid medicines by mouth or through an IV injection at the hospital.  · For severe attacks, you may need oxygen therapy or a breathing machine (ventilator).  · If your asthma attack was caused by an infection from bacteria, you will be given antibiotic medicines.  Follow these instructions at home:  Medicines  · Take over-the-counter and prescription medicines only as told by your health care provider.  ? Keep your medicines up-to-date.  ? Make sure you have all of your medicines available at all  times.  · If you were prescribed an antibiotic medicine, take it as told by your health care provider. Do not stop taking the antibiotic even if you start to feel better.  · Tell your doctor if you may be pregnant to make sure your asthma medicine is safe to use during pregnancy.  Avoiding triggers    · Keep track of things that trigger your asthma attacks. Avoid exposure to these triggers.  · Do not use any products that contain nicotine or tobacco, such as cigarettes, e-cigarettes, and chewing tobacco. If you need help quitting, ask your health care provider.  · When there is a lot of pollen, air pollution, or humidity, keep windows closed and use an air conditioner or go to places with air conditioning.  Asthma action plan  · Work with your health care provider to make a written plan for managing and treating your asthma attacks (asthma action plan). This plan should include:  ? A list of your asthma triggers and how to avoid them.  ? A list of symptoms that you may have during an asthma attack.  ? Information about which medicine to take, when to take the medicine and how much of the medicine to take.  ? Information to help you understand your peak flow measurements.  ? Daily actions that you can take to control your asthma symptoms.  ? Contact information for your health care providers.  · If you have an asthma attack, act quickly. Follow the emergency steps on your written asthma action plan. This may prevent you from needing to go to the hospital.  General instructions  · Avoid excessive exercise or activity until your asthma attack goes away. Ask your health care provider what activities are safe for you and when you can return to your normal activities.  · Stay up to date on all your vaccines, such as flu and pneumonia vaccines.  · Drink enough fluid to keep your urine pale yellow. Staying hydrated helps keep mucus in your lungs thin so it can be coughed up easily.  · Do not use alcohol until you have  "recovered.  · Keep all follow-up visits as told by your health care provider. This is important. Asthma requires careful medical care.  Contact a health care provider if:  · You have followed your action plan for 1 hour and your peak flow reading is still at 50-79%. This is in the yellow zone, which means \"caution.\"  · You need to use your quick reliever medicine more frequently than normal.  · Your medicines are causing side effects, such as rash, itching, swelling, or trouble breathing.  · Your symptoms do not improve after 48 hours.  · You cough up mucus that is thicker than usual.  · You have a fever.  Get help right away if:  · Your peak flow reading is less than 50% of your personal best. This is in the red zone, which means \"danger.\"  · You have trouble breathing.  · You develop chest pain or discomfort.  · Your medicines no longer seem to be helping.  · You are coughing up bloody mucus.  · You have a fever and your symptoms suddenly get worse.  · You have trouble swallowing.  · You feel very tired, and breathing becomes tiring.  These symptoms may represent a serious problem that is an emergency. Do not wait to see if the symptoms will go away. Get medical help right away. Call your local emergency services (911 in the [place] Do not drive yourself to the hospital.  Summary  · Asthma attacks are caused by narrowing or tightness in air passages, which causes shortness of breath, coughing, and loud breathing (wheezing).  · Many things can trigger an asthma attack, such as allergens, weather changes, exercise, strong odors, and smoke of any kind.  · If you have an asthma attack, act quickly. Follow the emergency steps on your written asthma action plan.  · Get help right away if you have severe trouble breathing, chest pain, or fever, or if your home medicines are no longer helping with your symptoms.  This information is not intended to replace advice given to you by your health care provider. Make sure you " discuss any questions you have with your health care provider.  Document Revised: 12/15/2020 Document Reviewed: 12/15/2020  [employer] Patient Education © 2021 [employer]

## 2021-09-22 RX ORDER — ESOMEPRAZOLE MAGNESIUM 40 MG/1
40 CAPSULE, DELAYED RELEASE ORAL DAILY
Qty: 30 CAPSULE | Refills: 5 | Status: SHIPPED | OUTPATIENT
Start: 2021-09-22 | End: 2022-04-13 | Stop reason: SDUPTHER

## 2021-10-11 DIAGNOSIS — Z78.9 UNKNOWN STATUS OF IMMUNITY TO COVID-19 VIRUS: Primary | ICD-10-CM

## 2021-10-15 ENCOUNTER — LAB (OUTPATIENT)
Dept: FAMILY MEDICINE CLINIC | Facility: CLINIC | Age: 46
End: 2021-10-15

## 2021-10-15 DIAGNOSIS — Z78.9 UNKNOWN STATUS OF IMMUNITY TO COVID-19 VIRUS: ICD-10-CM

## 2021-10-15 PROCEDURE — 86769 SARS-COV-2 COVID-19 ANTIBODY: CPT | Performed by: NURSE PRACTITIONER

## 2021-10-15 PROCEDURE — 36415 COLL VENOUS BLD VENIPUNCTURE: CPT | Performed by: NURSE PRACTITIONER

## 2021-10-16 LAB — SARS-COV-2 AB SERPL QL IA: NEGATIVE

## 2021-10-25 ENCOUNTER — OFFICE VISIT (OUTPATIENT)
Dept: FAMILY MEDICINE CLINIC | Facility: CLINIC | Age: 46
End: 2021-10-25

## 2021-10-25 VITALS
SYSTOLIC BLOOD PRESSURE: 112 MMHG | OXYGEN SATURATION: 98 % | BODY MASS INDEX: 27.17 KG/M2 | HEIGHT: 70 IN | DIASTOLIC BLOOD PRESSURE: 72 MMHG | WEIGHT: 189.8 LBS | HEART RATE: 73 BPM | TEMPERATURE: 97.5 F

## 2021-10-25 DIAGNOSIS — J30.2 SEASONAL ALLERGIC RHINITIS, UNSPECIFIED TRIGGER: ICD-10-CM

## 2021-10-25 DIAGNOSIS — H69.81 EUSTACHIAN TUBE DYSFUNCTION, RIGHT: Primary | ICD-10-CM

## 2021-10-25 PROBLEM — H69.91 EUSTACHIAN TUBE DYSFUNCTION, RIGHT: Status: ACTIVE | Noted: 2021-10-25

## 2021-10-25 PROCEDURE — 99213 OFFICE O/P EST LOW 20 MIN: CPT | Performed by: NURSE PRACTITIONER

## 2021-10-25 RX ORDER — FLUTICASONE PROPIONATE 50 MCG
2 SPRAY, SUSPENSION (ML) NASAL DAILY
Qty: 16 G | Refills: 5 | Status: SHIPPED | OUTPATIENT
Start: 2021-10-25 | End: 2022-04-13 | Stop reason: SDUPTHER

## 2021-10-25 RX ORDER — CHLORPHENIRAMINE MALEATE 4 MG/1
4 TABLET ORAL EVERY 4 HOURS PRN
Qty: 60 TABLET | Refills: 1 | Status: SHIPPED | OUTPATIENT
Start: 2021-10-25 | End: 2022-04-13 | Stop reason: SDUPTHER

## 2021-10-25 NOTE — ASSESSMENT & PLAN NOTE
I will start him on Flonase nasal spray daily.  We will also have him take chlorphentermine every 4-6 hours as needed, advised may cause drowsiness and not to drive while taking.

## 2021-10-25 NOTE — PROGRESS NOTES
"Chief Complaint  Tinnitus    Subjective          Subhash Barba presents to [facility]  History of Present Illness  He is here for an acute visit today complaining of a clicking and popping in his right ear that was intermittent but now is constant.  He states he also has a dull ache in the ear now.  He also states that music and sounds tend to make the clicking sounds more amplified.  He takes Singulair for his asthma.  He is not currently taking any antihistamines.  He states Claritin and Zyrtec did not help much for him.  He states he takes Benadryl as needed which seems to help more.  He states he used to get allergy shots previously.  He would like a referral to his allergist.  Objective   Vital Signs:   /72   Pulse 73   Temp 97.5 °F (36.4 °C)   Ht 177.8 cm (70\")   Wt 86.1 kg (189 lb 12.8 oz)   SpO2 98%   BMI 27.23 kg/m²     Physical Exam  Vitals reviewed.   Constitutional:       Appearance: Normal appearance. He is well-developed.   HENT:      Right Ear: Hearing, tympanic membrane, ear canal and external ear normal.      Left Ear: Hearing, tympanic membrane, ear canal and external ear normal.      Mouth/Throat:      Pharynx: No pharyngeal swelling, oropharyngeal exudate or posterior oropharyngeal erythema.      Comments: Postnasal drainage noted.  Neck:      Thyroid: No thyroid mass, thyromegaly or thyroid tenderness.   Cardiovascular:      Rate and Rhythm: Normal rate and regular rhythm.      Heart sounds: No murmur heard.  No friction rub. No gallop.    Pulmonary:      Effort: Pulmonary effort is normal.      Breath sounds: Normal breath sounds. No wheezing or rhonchi.   Lymphadenopathy:      Cervical: Cervical adenopathy present.   Skin:     General: Skin is warm and dry.   Neurological:      Mental Status: He is alert and oriented to person, place, and time.      Cranial Nerves: No cranial nerve deficit.   Psychiatric:         Mood and Affect: Mood and " affect normal.         Behavior: Behavior normal.         Thought Content: Thought content normal. Thought content does not include homicidal or suicidal ideation.         Judgment: Judgment normal.        Result Review :                 Assessment and Plan    Diagnoses and all orders for this visit:    1. Eustachian tube dysfunction, right (Primary)  Assessment & Plan:  I will start him on Flonase nasal spray daily.  We will also have him take chlorphentermine every 4-6 hours as needed, advised may cause drowsiness and not to drive while taking.      2. Seasonal allergic rhinitis, unspecified trigger  -     Ambulatory Referral to Allergy    Other orders  -     fluticasone (Flonase) 50 MCG/ACT nasal spray; 2 sprays into the nostril(s) as directed by provider Daily.  Dispense: 16 g; Refill: 5  -     chlorpheniramine (CHLOR-TRIMETON) 4 MG tablet; Take 1 tablet by mouth Every 4 (Four) Hours As Needed for Allergies.  Dispense: 60 tablet; Refill: 1      Follow Up   Return if symptoms worsen or fail to improve.  Patient was given instructions and counseling regarding his condition or for health maintenance advice. Please see specific information pulled into the AVS if appropriate.

## 2021-11-04 RX ORDER — ATORVASTATIN CALCIUM 40 MG/1
TABLET, FILM COATED ORAL
Qty: 30 TABLET | Refills: 3 | Status: SHIPPED | OUTPATIENT
Start: 2021-11-04 | End: 2022-04-13 | Stop reason: SDUPTHER

## 2022-03-29 RX ORDER — ESOMEPRAZOLE MAGNESIUM 40 MG/1
40 CAPSULE, DELAYED RELEASE ORAL DAILY
Qty: 30 CAPSULE | Refills: 5 | OUTPATIENT
Start: 2022-03-29

## 2022-03-29 RX ORDER — CHLORPHENIRAMINE MALEATE 4 MG/1
4 TABLET ORAL EVERY 4 HOURS PRN
Qty: 60 TABLET | Refills: 1 | OUTPATIENT
Start: 2022-03-29

## 2022-04-01 ENCOUNTER — TELEPHONE (OUTPATIENT)
Dept: FAMILY MEDICINE CLINIC | Facility: CLINIC | Age: 47
End: 2022-04-01

## 2022-04-01 NOTE — TELEPHONE ENCOUNTER
Left voicemail for patient to call and schedule a follow up appt so we can refill his medications.

## 2022-04-13 ENCOUNTER — OFFICE VISIT (OUTPATIENT)
Dept: FAMILY MEDICINE CLINIC | Facility: CLINIC | Age: 47
End: 2022-04-13

## 2022-04-13 VITALS
TEMPERATURE: 98.1 F | HEIGHT: 68 IN | DIASTOLIC BLOOD PRESSURE: 82 MMHG | WEIGHT: 187 LBS | OXYGEN SATURATION: 98 % | BODY MASS INDEX: 28.34 KG/M2 | HEART RATE: 69 BPM | SYSTOLIC BLOOD PRESSURE: 112 MMHG

## 2022-04-13 DIAGNOSIS — K21.9 HIATAL HERNIA WITH GASTROESOPHAGEAL REFLUX: ICD-10-CM

## 2022-04-13 DIAGNOSIS — F32.A ANXIETY AND DEPRESSION: ICD-10-CM

## 2022-04-13 DIAGNOSIS — M54.50 ACUTE BILATERAL LOW BACK PAIN WITHOUT SCIATICA: Primary | ICD-10-CM

## 2022-04-13 DIAGNOSIS — Z12.11 SCREENING FOR MALIGNANT NEOPLASM OF COLON: ICD-10-CM

## 2022-04-13 DIAGNOSIS — J45.20 MILD INTERMITTENT ASTHMA WITHOUT COMPLICATION: ICD-10-CM

## 2022-04-13 DIAGNOSIS — E78.2 MIXED HYPERLIPIDEMIA: ICD-10-CM

## 2022-04-13 DIAGNOSIS — Z00.00 ANNUAL PHYSICAL EXAM: ICD-10-CM

## 2022-04-13 DIAGNOSIS — Z11.59 NEED FOR HEPATITIS C SCREENING TEST: ICD-10-CM

## 2022-04-13 DIAGNOSIS — F41.9 ANXIETY AND DEPRESSION: ICD-10-CM

## 2022-04-13 DIAGNOSIS — K44.9 HIATAL HERNIA WITH GASTROESOPHAGEAL REFLUX: ICD-10-CM

## 2022-04-13 LAB
ALBUMIN SERPL-MCNC: 4.9 G/DL (ref 3.5–5.2)
ALBUMIN/GLOB SERPL: 2 G/DL
ALP SERPL-CCNC: 64 U/L (ref 39–117)
ALT SERPL W P-5'-P-CCNC: 17 U/L (ref 1–41)
ANION GAP SERPL CALCULATED.3IONS-SCNC: 12.1 MMOL/L (ref 5–15)
AST SERPL-CCNC: 24 U/L (ref 1–40)
BASOPHILS # BLD AUTO: 0.06 10*3/MM3 (ref 0–0.2)
BASOPHILS NFR BLD AUTO: 0.8 % (ref 0–1.5)
BILIRUB SERPL-MCNC: 0.5 MG/DL (ref 0–1.2)
BUN SERPL-MCNC: 10 MG/DL (ref 6–20)
BUN/CREAT SERPL: 8.5 (ref 7–25)
CALCIUM SPEC-SCNC: 9.7 MG/DL (ref 8.6–10.5)
CHLORIDE SERPL-SCNC: 102 MMOL/L (ref 98–107)
CHOLEST SERPL-MCNC: 182 MG/DL (ref 0–200)
CO2 SERPL-SCNC: 23.9 MMOL/L (ref 22–29)
CREAT SERPL-MCNC: 1.18 MG/DL (ref 0.76–1.27)
DEPRECATED RDW RBC AUTO: 41.1 FL (ref 37–54)
EGFRCR SERPLBLD CKD-EPI 2021: 77.5 ML/MIN/1.73
EOSINOPHIL # BLD AUTO: 0.36 10*3/MM3 (ref 0–0.4)
EOSINOPHIL NFR BLD AUTO: 4.9 % (ref 0.3–6.2)
ERYTHROCYTE [DISTWIDTH] IN BLOOD BY AUTOMATED COUNT: 12.6 % (ref 12.3–15.4)
GLOBULIN UR ELPH-MCNC: 2.4 GM/DL
GLUCOSE SERPL-MCNC: 95 MG/DL (ref 65–99)
HCT VFR BLD AUTO: 48.1 % (ref 37.5–51)
HCV AB SER DONR QL: NORMAL
HDLC SERPL-MCNC: 38 MG/DL (ref 40–60)
HGB BLD-MCNC: 16.2 G/DL (ref 13–17.7)
IMM GRANULOCYTES # BLD AUTO: 0.01 10*3/MM3 (ref 0–0.05)
IMM GRANULOCYTES NFR BLD AUTO: 0.1 % (ref 0–0.5)
LDLC SERPL CALC-MCNC: 118 MG/DL (ref 0–100)
LDLC/HDLC SERPL: 3.01 {RATIO}
LYMPHOCYTES # BLD AUTO: 1.75 10*3/MM3 (ref 0.7–3.1)
LYMPHOCYTES NFR BLD AUTO: 23.8 % (ref 19.6–45.3)
MCH RBC QN AUTO: 30.5 PG (ref 26.6–33)
MCHC RBC AUTO-ENTMCNC: 33.7 G/DL (ref 31.5–35.7)
MCV RBC AUTO: 90.4 FL (ref 79–97)
MONOCYTES # BLD AUTO: 0.53 10*3/MM3 (ref 0.1–0.9)
MONOCYTES NFR BLD AUTO: 7.2 % (ref 5–12)
NEUTROPHILS NFR BLD AUTO: 4.63 10*3/MM3 (ref 1.7–7)
NEUTROPHILS NFR BLD AUTO: 63.2 % (ref 42.7–76)
NRBC BLD AUTO-RTO: 0 /100 WBC (ref 0–0.2)
PLATELET # BLD AUTO: 245 10*3/MM3 (ref 140–450)
PMV BLD AUTO: 9.8 FL (ref 6–12)
POTASSIUM SERPL-SCNC: 4.5 MMOL/L (ref 3.5–5.2)
PROT SERPL-MCNC: 7.3 G/DL (ref 6–8.5)
RBC # BLD AUTO: 5.32 10*6/MM3 (ref 4.14–5.8)
SODIUM SERPL-SCNC: 138 MMOL/L (ref 136–145)
TRIGL SERPL-MCNC: 148 MG/DL (ref 0–150)
TSH SERPL DL<=0.05 MIU/L-ACNC: 1.32 UIU/ML (ref 0.27–4.2)
VLDLC SERPL-MCNC: 26 MG/DL (ref 5–40)
WBC NRBC COR # BLD: 7.34 10*3/MM3 (ref 3.4–10.8)

## 2022-04-13 PROCEDURE — 80053 COMPREHEN METABOLIC PANEL: CPT | Performed by: NURSE PRACTITIONER

## 2022-04-13 PROCEDURE — 86803 HEPATITIS C AB TEST: CPT | Performed by: NURSE PRACTITIONER

## 2022-04-13 PROCEDURE — 85025 COMPLETE CBC W/AUTO DIFF WBC: CPT | Performed by: NURSE PRACTITIONER

## 2022-04-13 PROCEDURE — 84443 ASSAY THYROID STIM HORMONE: CPT | Performed by: NURSE PRACTITIONER

## 2022-04-13 PROCEDURE — 36415 COLL VENOUS BLD VENIPUNCTURE: CPT | Performed by: NURSE PRACTITIONER

## 2022-04-13 PROCEDURE — 80061 LIPID PANEL: CPT | Performed by: NURSE PRACTITIONER

## 2022-04-13 PROCEDURE — 99396 PREV VISIT EST AGE 40-64: CPT | Performed by: NURSE PRACTITIONER

## 2022-04-13 RX ORDER — FLUTICASONE PROPIONATE 50 MCG
2 SPRAY, SUSPENSION (ML) NASAL DAILY
Qty: 16 G | Refills: 5 | Status: SHIPPED | OUTPATIENT
Start: 2022-04-13

## 2022-04-13 RX ORDER — ATORVASTATIN CALCIUM 40 MG/1
40 TABLET, FILM COATED ORAL
Qty: 90 TABLET | Refills: 1 | Status: SHIPPED | OUTPATIENT
Start: 2022-04-13

## 2022-04-13 RX ORDER — CYCLOBENZAPRINE HCL 10 MG
10 TABLET ORAL 3 TIMES DAILY PRN
Qty: 90 TABLET | Refills: 0 | Status: SHIPPED | OUTPATIENT
Start: 2022-04-13 | End: 2022-11-04

## 2022-04-13 RX ORDER — CHLORPHENIRAMINE MALEATE 4 MG/1
4 TABLET ORAL EVERY 4 HOURS PRN
Qty: 60 TABLET | Refills: 5 | Status: SHIPPED | OUTPATIENT
Start: 2022-04-13

## 2022-04-13 RX ORDER — MELOXICAM 15 MG/1
15 TABLET ORAL EVERY MORNING
Qty: 30 TABLET | Refills: 2 | Status: SHIPPED | OUTPATIENT
Start: 2022-04-13 | End: 2022-11-04

## 2022-04-13 RX ORDER — ESOMEPRAZOLE MAGNESIUM 40 MG/1
40 CAPSULE, DELAYED RELEASE ORAL DAILY
Qty: 30 CAPSULE | Refills: 5 | Status: SHIPPED | OUTPATIENT
Start: 2022-04-13 | End: 2022-10-21 | Stop reason: SDUPTHER

## 2022-04-13 NOTE — ASSESSMENT & PLAN NOTE
Asthma is improving with treatment.  The patient is experiencing no daytime asthma symptoms. He is experiencing no nighttime asthma symptoms.  Discussed monitoring symptoms and use of quick-relief medications and contacting us early in the course of exacerbations.  Warning signs of respiratory distress were reviewed with the patient.   Medications: continue Singulair daily and albuterol inhaler as needed.  Follow up in 6 months, or sooner should new symptoms or problems arise.

## 2022-04-13 NOTE — PROGRESS NOTES
Chief Complaint  Back Pain and Annual Exam    Subjective     {Problem List  Visit Diagnosis   Encounters  Notes  Medications  Labs  Result Review Imaging  Media :23}     Subhash Barba presents to [facility]  History of Present Illness   46-year-old male presents today with complaints of back pain.  He also is due for annual exam, follow-up and lab work-up.    He is complaining of lower back pain for the last couple weeks.  He denies any known injuries.  He states he does lift frequently for his work.  He states that his wife was able to feel a bump on his lower left side of his back.  He has been taking some ibuprofen without any good relief.    He has not had a colorectal screening.  He has not had a hepatitis C screening.  He states he has had a tetanus shot within the last 5 years.  He does not want to get the Covid vaccine.  He has not had pneumonia and has not had a pneumonia vaccine.    Asthma: He has mild asthma that is well controlled with Singulair, he rarely has to use his albuterol inhaler.    Hiatal hernia with GERD: He is stable on Nexium and needs a refill.    Anxiety and depression: He is currently stable on Zoloft 100 mg daily.    Mixed hyperlipidemia: He is currently on atorvastatin 40 mg every evening, he is fasting today and needs a lipid panel.    Current Outpatient Medications on File Prior to Visit   Medication Sig Dispense Refill   • albuterol sulfate  (90 Base) MCG/ACT inhaler Inhale 1-2 puffs Every SIX Hours As Needed for Wheezing OR Shortness OF Air for UP TO 30 DAYS     • montelukast (Singulair) 10 MG tablet Take 1 tablet by mouth Every Night. 90 tablet 3   • sertraline (ZOLOFT) 100 MG tablet Take 1 tablet by mouth Daily. 90 tablet 3   • [DISCONTINUED] atorvastatin (LIPITOR) 40 MG tablet TAKE ONE TABLET BY MOUTH AT BEDTIME 30 tablet 3   • [DISCONTINUED] chlorpheniramine (CHLOR-TRIMETON) 4 MG tablet Take 1 tablet by mouth Every 4 (Four)  "Hours As Needed for Allergies. 60 tablet 1   • [DISCONTINUED] esomeprazole (nexIUM) 40 MG capsule Take 1 capsule by mouth Daily. 30 capsule 5   • [DISCONTINUED] fluticasone (Flonase) 50 MCG/ACT nasal spray 2 sprays into the nostril(s) as directed by provider Daily. 16 g 5   • sildenafil (Viagra) 50 MG tablet Take 1 tablet by mouth Daily As Needed for Erectile Dysfunction for up to 90 days. 30 tablet 2     No current facility-administered medications on file prior to visit.       Objective   Vital Signs:   /82   Pulse 69   Temp 98.1 °F (36.7 °C)   Ht 172.7 cm (68\")   Wt 84.8 kg (187 lb)   SpO2 98%   BMI 28.43 kg/m²     Physical Exam  Vitals reviewed.   Constitutional:       Appearance: Normal appearance. He is well-developed.   Neck:      Thyroid: No thyroid mass, thyromegaly or thyroid tenderness.   Cardiovascular:      Rate and Rhythm: Normal rate and regular rhythm.      Heart sounds: No murmur heard.    No friction rub. No gallop.   Pulmonary:      Effort: Pulmonary effort is normal.      Breath sounds: Normal breath sounds. No wheezing or rhonchi.   Musculoskeletal:      Lumbar back: Spasms present. No tenderness.        Back:    Lymphadenopathy:      Cervical: No cervical adenopathy.   Skin:     General: Skin is warm and dry.   Neurological:      Mental Status: He is alert and oriented to person, place, and time.      Cranial Nerves: No cranial nerve deficit.   Psychiatric:         Mood and Affect: Mood and affect normal.         Behavior: Behavior normal.         Thought Content: Thought content normal. Thought content does not include homicidal or suicidal ideation.         Judgment: Judgment normal.        Result Review :                 Assessment and Plan    Diagnoses and all orders for this visit:    1. Acute bilateral low back pain without sciatica (Primary)  -     XR Spine Lumbar 2 or 3 View; Future    2. Annual physical exam  Comments:  Discussed colorectal screening, hepatitis C screening. "  Discussed tetanus shot patient is up-to-date, declines Covid shot, pneumonia vaccine not indicated.    3. Mixed hyperlipidemia  Assessment & Plan:  Lipid abnormalities are improving with treatment.  Pharmacotherapy as ordered.  Fasting lipid panel today.  Lipids will be reassessed in 6 months.    Orders:  -     Comprehensive Metabolic Panel  -     Lipid Panel    4. Anxiety and depression  Assessment & Plan:  Patient's depression is single episode and is moderate without psychosis. Their depression is currently active and the condition is improving with treatment. This will be reassessed at the next regular appointment. F/U as described:patient will continue current medication therapy.    Orders:  -     CBC Auto Differential  -     TSH Rfx On Abnormal To Free T4    5. Hiatal hernia with gastroesophageal reflux  Assessment & Plan:  Currently stable on Nexium we will continue current dose.    Orders:  -     CBC Auto Differential    6. Mild intermittent asthma without complication  Assessment & Plan:  Asthma is improving with treatment.  The patient is experiencing no daytime asthma symptoms. He is experiencing no nighttime asthma symptoms.  Discussed monitoring symptoms and use of quick-relief medications and contacting us early in the course of exacerbations.  Warning signs of respiratory distress were reviewed with the patient.   Medications: continue Singulair daily and albuterol inhaler as needed.  Follow up in 6 months, or sooner should new symptoms or problems arise.          7. Screening for malignant neoplasm of colon  -     Cancel: Ambulatory Referral For Screening Colonoscopy  -     Ambulatory Referral For Screening Colonoscopy    8. Need for hepatitis C screening test  -     Hepatitis C Antibody    Other orders  -     chlorpheniramine (CHLOR-TRIMETON) 4 MG tablet; Take 1 tablet by mouth Every 4 (Four) Hours As Needed for Allergies.  Dispense: 60 tablet; Refill: 5  -     esomeprazole (nexIUM) 40 MG capsule;  Take 1 capsule by mouth Daily.  Dispense: 30 capsule; Refill: 5  -     atorvastatin (LIPITOR) 40 MG tablet; Take 1 tablet by mouth every night at bedtime.  Dispense: 90 tablet; Refill: 1  -     fluticasone (Flonase) 50 MCG/ACT nasal spray; 2 sprays into the nostril(s) as directed by provider Daily.  Dispense: 16 g; Refill: 5  -     cyclobenzaprine (FLEXERIL) 10 MG tablet; Take 1 tablet by mouth 3 (Three) Times a Day As Needed for Muscle Spasms (back pain).  Dispense: 90 tablet; Refill: 0  -     meloxicam (MOBIC) 15 MG tablet; Take 1 tablet by mouth Every Morning.  Dispense: 30 tablet; Refill: 2      Follow Up   Return in about 6 months (around 10/13/2022) for Next scheduled follow up.  Patient was given instructions and counseling regarding his condition or for health maintenance advice. Please see specific information pulled into the AVS if appropriate.

## 2022-04-13 NOTE — ASSESSMENT & PLAN NOTE
Lipid abnormalities are improving with treatment.  Pharmacotherapy as ordered.  Fasting lipid panel today.  Lipids will be reassessed in 6 months.

## 2022-05-17 ENCOUNTER — APPOINTMENT (OUTPATIENT)
Dept: CT IMAGING | Facility: HOSPITAL | Age: 47
End: 2022-05-17

## 2022-05-17 ENCOUNTER — HOSPITAL ENCOUNTER (EMERGENCY)
Facility: HOSPITAL | Age: 47
Discharge: HOME | End: 2022-05-17
Attending: EMERGENCY MEDICINE | Admitting: EMERGENCY MEDICINE

## 2022-05-17 VITALS
BODY MASS INDEX: 28.77 KG/M2 | HEART RATE: 82 BPM | RESPIRATION RATE: 18 BRPM | DIASTOLIC BLOOD PRESSURE: 97 MMHG | TEMPERATURE: 98.2 F | HEIGHT: 68 IN | SYSTOLIC BLOOD PRESSURE: 136 MMHG | WEIGHT: 189.82 LBS | OXYGEN SATURATION: 98 %

## 2022-05-17 DIAGNOSIS — R10.9 RIGHT FLANK PAIN: Primary | ICD-10-CM

## 2022-05-17 LAB
ALBUMIN SERPL-MCNC: 5 G/DL (ref 3.5–5.2)
ALBUMIN/GLOB SERPL: 1.6 G/DL
ALP SERPL-CCNC: 71 U/L (ref 39–117)
ALT SERPL W P-5'-P-CCNC: 26 U/L (ref 1–41)
ANION GAP SERPL CALCULATED.3IONS-SCNC: 13.4 MMOL/L (ref 5–15)
AST SERPL-CCNC: 22 U/L (ref 1–40)
BASOPHILS # BLD AUTO: 0.07 10*3/MM3 (ref 0–0.2)
BASOPHILS NFR BLD AUTO: 0.8 % (ref 0–1.5)
BILIRUB SERPL-MCNC: 0.4 MG/DL (ref 0–1.2)
BILIRUB UR QL STRIP: NEGATIVE
BUN SERPL-MCNC: 10 MG/DL (ref 6–20)
BUN/CREAT SERPL: 9.9 (ref 7–25)
CALCIUM SPEC-SCNC: 10.4 MG/DL (ref 8.6–10.5)
CHLORIDE SERPL-SCNC: 101 MMOL/L (ref 98–107)
CLARITY UR: CLEAR
CO2 SERPL-SCNC: 25.6 MMOL/L (ref 22–29)
COLOR UR: YELLOW
CREAT SERPL-MCNC: 1.01 MG/DL (ref 0.76–1.27)
DEPRECATED RDW RBC AUTO: 43.4 FL (ref 37–54)
EGFRCR SERPLBLD CKD-EPI 2021: 93.5 ML/MIN/1.73
EOSINOPHIL # BLD AUTO: 0.47 10*3/MM3 (ref 0–0.4)
EOSINOPHIL NFR BLD AUTO: 5.1 % (ref 0.3–6.2)
ERYTHROCYTE [DISTWIDTH] IN BLOOD BY AUTOMATED COUNT: 12.7 % (ref 12.3–15.4)
GLOBULIN UR ELPH-MCNC: 3.1 GM/DL
GLUCOSE SERPL-MCNC: 103 MG/DL (ref 65–99)
GLUCOSE UR STRIP-MCNC: NEGATIVE MG/DL
HCT VFR BLD AUTO: 50.6 % (ref 37.5–51)
HGB BLD-MCNC: 16.6 G/DL (ref 13–17.7)
HGB UR QL STRIP.AUTO: NEGATIVE
HOLD SPECIMEN: NORMAL
HOLD SPECIMEN: NORMAL
IMM GRANULOCYTES # BLD AUTO: 0.03 10*3/MM3 (ref 0–0.05)
IMM GRANULOCYTES NFR BLD AUTO: 0.3 % (ref 0–0.5)
KETONES UR QL STRIP: NEGATIVE
LEUKOCYTE ESTERASE UR QL STRIP.AUTO: NEGATIVE
LIPASE SERPL-CCNC: 39 U/L (ref 13–60)
LYMPHOCYTES # BLD AUTO: 2.07 10*3/MM3 (ref 0.7–3.1)
LYMPHOCYTES NFR BLD AUTO: 22.3 % (ref 19.6–45.3)
MCH RBC QN AUTO: 30.2 PG (ref 26.6–33)
MCHC RBC AUTO-ENTMCNC: 32.8 G/DL (ref 31.5–35.7)
MCV RBC AUTO: 92.2 FL (ref 79–97)
MONOCYTES # BLD AUTO: 0.64 10*3/MM3 (ref 0.1–0.9)
MONOCYTES NFR BLD AUTO: 6.9 % (ref 5–12)
NEUTROPHILS NFR BLD AUTO: 6 10*3/MM3 (ref 1.7–7)
NEUTROPHILS NFR BLD AUTO: 64.6 % (ref 42.7–76)
NITRITE UR QL STRIP: NEGATIVE
NRBC BLD AUTO-RTO: 0 /100 WBC (ref 0–0.2)
PH UR STRIP.AUTO: <=5 [PH] (ref 5–8)
PLATELET # BLD AUTO: 263 10*3/MM3 (ref 140–450)
PMV BLD AUTO: 9.2 FL (ref 6–12)
POTASSIUM SERPL-SCNC: 4.1 MMOL/L (ref 3.5–5.2)
PROT SERPL-MCNC: 8.1 G/DL (ref 6–8.5)
PROT UR QL STRIP: NEGATIVE
RBC # BLD AUTO: 5.49 10*6/MM3 (ref 4.14–5.8)
SODIUM SERPL-SCNC: 140 MMOL/L (ref 136–145)
SP GR UR STRIP: 1.02 (ref 1–1.03)
UROBILINOGEN UR QL STRIP: NORMAL
WBC NRBC COR # BLD: 9.28 10*3/MM3 (ref 3.4–10.8)
WHOLE BLOOD HOLD COAG: NORMAL
WHOLE BLOOD HOLD SPECIMEN: NORMAL

## 2022-05-17 PROCEDURE — 25010000002 KETOROLAC TROMETHAMINE PER 15 MG: Performed by: EMERGENCY MEDICINE

## 2022-05-17 PROCEDURE — 74177 CT ABD & PELVIS W/CONTRAST: CPT

## 2022-05-17 PROCEDURE — 80053 COMPREHEN METABOLIC PANEL: CPT | Performed by: EMERGENCY MEDICINE

## 2022-05-17 PROCEDURE — 25010000002 ONDANSETRON PER 1 MG: Performed by: EMERGENCY MEDICINE

## 2022-05-17 PROCEDURE — 99283 EMERGENCY DEPT VISIT LOW MDM: CPT

## 2022-05-17 PROCEDURE — 85025 COMPLETE CBC W/AUTO DIFF WBC: CPT | Performed by: EMERGENCY MEDICINE

## 2022-05-17 PROCEDURE — 96375 TX/PRO/DX INJ NEW DRUG ADDON: CPT

## 2022-05-17 PROCEDURE — 81003 URINALYSIS AUTO W/O SCOPE: CPT | Performed by: EMERGENCY MEDICINE

## 2022-05-17 PROCEDURE — 83690 ASSAY OF LIPASE: CPT | Performed by: EMERGENCY MEDICINE

## 2022-05-17 PROCEDURE — 96374 THER/PROPH/DIAG INJ IV PUSH: CPT

## 2022-05-17 PROCEDURE — 0 IOPAMIDOL PER 1 ML: Performed by: EMERGENCY MEDICINE

## 2022-05-17 RX ORDER — KETOROLAC TROMETHAMINE 30 MG/ML
30 INJECTION, SOLUTION INTRAMUSCULAR; INTRAVENOUS ONCE
Status: COMPLETED | OUTPATIENT
Start: 2022-05-17 | End: 2022-05-17

## 2022-05-17 RX ORDER — DIFLUNISAL 500 MG/1
500 TABLET, FILM COATED ORAL 2 TIMES DAILY PRN
Qty: 20 TABLET | Refills: 0 | Status: SHIPPED | OUTPATIENT
Start: 2022-05-17 | End: 2022-11-04

## 2022-05-17 RX ORDER — CYCLOBENZAPRINE HCL 10 MG
10 TABLET ORAL 3 TIMES DAILY PRN
Qty: 15 TABLET | Refills: 0 | Status: SHIPPED | OUTPATIENT
Start: 2022-05-17 | End: 2022-11-04

## 2022-05-17 RX ORDER — ONDANSETRON 2 MG/ML
4 INJECTION INTRAMUSCULAR; INTRAVENOUS ONCE
Status: COMPLETED | OUTPATIENT
Start: 2022-05-17 | End: 2022-05-17

## 2022-05-17 RX ORDER — SODIUM CHLORIDE 0.9 % (FLUSH) 0.9 %
10 SYRINGE (ML) INJECTION AS NEEDED
Status: DISCONTINUED | OUTPATIENT
Start: 2022-05-17 | End: 2022-05-17 | Stop reason: HOSPADM

## 2022-05-17 RX ADMIN — ONDANSETRON 4 MG: 2 INJECTION INTRAMUSCULAR; INTRAVENOUS at 08:15

## 2022-05-17 RX ADMIN — KETOROLAC TROMETHAMINE 30 MG: 30 INJECTION, SOLUTION INTRAMUSCULAR; INTRAVENOUS at 08:14

## 2022-05-17 RX ADMIN — SODIUM CHLORIDE 1000 ML: 9 INJECTION, SOLUTION INTRAVENOUS at 08:15

## 2022-05-17 RX ADMIN — IOPAMIDOL 100 ML: 755 INJECTION, SOLUTION INTRAVENOUS at 08:05

## 2022-05-17 NOTE — ED PROVIDER NOTES
Subjective   Subhash Barba is a 46 y.o. male who presents to the emergency department today with complaints of right flank pain. Pt states the pain has only been in the evening over the past three days--however since last night around 2300 the right flank pain has been constant. Pt claims he does feel the need to move around in order to reliever sx. He complains of associated nausea. He denies any chest pain, fever, emesis, dysuria, hematuria, or any other pertinent sx.       History provided by:  Patient   used: No        Review of Systems   Constitutional: Negative for chills and fever.   HENT: Negative for congestion, rhinorrhea and sore throat.    Eyes: Negative for pain and visual disturbance.   Respiratory: Negative for apnea, cough, chest tightness and shortness of breath.    Cardiovascular: Negative for chest pain and palpitations.   Gastrointestinal: Positive for nausea. Negative for abdominal pain, diarrhea and vomiting.   Genitourinary: Positive for flank pain. Negative for difficulty urinating and dysuria.   Musculoskeletal: Negative for joint swelling and myalgias.   Skin: Negative for color change.   Neurological: Negative for seizures and headaches.   Psychiatric/Behavioral: Negative.    All other systems reviewed and are negative.      Past Medical History:   Diagnosis Date   • Anxiety    • Asthma    • Broken bones    • Head injuries    • Hiatal hernia with gastroesophageal reflux 03/03/2021   • Reflux esophagitis        No Known Allergies    No past surgical history on file.    Family History   Problem Relation Age of Onset   • Diabetes Mother        Social History     Socioeconomic History   • Marital status:    Tobacco Use   • Smoking status: Current Every Day Smoker     Packs/day: 0.50     Years: 10.00     Pack years: 5.00     Types: Cigarettes     Start date: 1992     Last attempt to quit: 2019     Years since quitting: 3.3   • Smokeless tobacco: Never Used    Vaping Use   • Vaping Use: Never used   Substance and Sexual Activity   • Alcohol use: Never   • Drug use: Never   • Sexual activity: Defer         Objective   Physical Exam  Vitals and nursing note reviewed.   Constitutional:       General: He is not in acute distress.     Appearance: Normal appearance.   HENT:      Head: Normocephalic and atraumatic.      Nose: Nose normal.      Mouth/Throat:      Pharynx: Oropharynx is clear.   Eyes:      General: No scleral icterus.     Conjunctiva/sclera: Conjunctivae normal.   Cardiovascular:      Rate and Rhythm: Normal rate and regular rhythm.      Heart sounds: Normal heart sounds. No murmur heard.  Pulmonary:      Effort: No respiratory distress.      Breath sounds: Normal breath sounds. No wheezing, rhonchi or rales.   Chest:      Chest wall: No tenderness.   Abdominal:      Palpations: Abdomen is soft.      Tenderness: There is no abdominal tenderness. There is no guarding or rebound.      Comments: No rigidity.   Musculoskeletal:         General: No tenderness. Normal range of motion.      Cervical back: Normal range of motion and neck supple.      Right lower leg: No edema.      Left lower leg: No edema.   Skin:     General: Skin is warm and dry.   Neurological:      Mental Status: He is alert. Mental status is at baseline.   Psychiatric:         Mood and Affect: Mood normal.         Behavior: Behavior normal.         Procedures         ED Course     Labs Reviewed   COMPREHENSIVE METABOLIC PANEL - Abnormal; Notable for the following components:       Result Value    Glucose 103 (*)     All other components within normal limits    Narrative:     GFR Normal >60  Chronic Kidney Disease <60  Kidney Failure <15     CBC WITH AUTO DIFFERENTIAL - Abnormal; Notable for the following components:    Eosinophils, Absolute 0.47 (*)     All other components within normal limits   LIPASE - Normal   URINALYSIS W/ MICROSCOPIC IF INDICATED (NO CULTURE) - Normal    Narrative:     Urine  microscopic not indicated.   RAINBOW DRAW    Narrative:     The following orders were created for panel order [place] Draw.  Procedure                               Abnormality         Status                     ---------                               -----------         ------                     Green Top (Gel)[[id number]]                                  Final result               Lavender Top[[id number]]                                     Final result               Gold Top - SST[[id number]]                                   Final result               Light Blue Top[[id number]]                                   Final result                 Please view results for these tests on the individual orders.   CBC AND DIFFERENTIAL    Narrative:     The following orders were created for panel order CBC & Differential.  Procedure                               Abnormality         Status                     ---------                               -----------         ------                     CBC Auto Differential[[id number]]        Abnormal            Final result                 Please view results for these tests on the individual orders.   GREEN TOP   LAVENDER TOP   GOLD TOP - SST   LIGHT BLUE TOP     No Radiology Exams Resulted Within Past 24 Hours                            CT Abdomen Pelvis With Contrast    Result Date: 5/17/2022  Narrative: PROCEDURE: CT ABDOMEN PELVIS W CONTRAST  COMPARISON: [facility], CT, CHEST W/ CONTRAST, 7/16/2013, 13:59.  INDICATIONS: Right flank pain  PROTOCOL:   Standard imaging protocol performed    RADIATION:   DLP: 858.5mGy*cm   Automated exposure control was utilized to minimize radiation dose. CONTRAST: 100cc Isovue 370 I.V.  TECHNIQUE: Axial images of the abdomen and pelvis with intravenous contrast.  ABDOMEN:  The lung bases are clear.  There is a moderate hiatal hernia.  The liver, spleen, pancreas, adrenal glands and kidneys are normal.  There are no inflammatory changes  around the gallbladder.  PELVIS:  No evidence of bowel obstruction, perforation or abscess.  The appendix and terminal ileum are normal.  No CT evidence of colitis.  The abdominal aorta has a normal caliber.  No ureteral or urinary bladder calcifications are identified.  There are no acute osseous abnormalities.  Minimal fat containing umbilical hernia.  IMPRESSION:  No acute findings.  JING SCOTT MD       Electronically Signed and Approved By: JING SCOTT MD on 5/17/2022 at 8:16                           MDM  Number of Diagnoses or Management Options     Amount and/or Complexity of Data Reviewed  Clinical lab tests: reviewed  Tests in the radiology section of CPT®: reviewed  Decide to obtain previous medical records or to obtain history from someone other than the patient: yes  Review and summarize past medical records: yes  Discuss the patient with other providers: yes  Independent visualization of images, tracings, or specimens: yes    Patient Progress  Patient progress: improved      Final diagnoses:   Right flank pain       Documentation assistance provided by seun Pulliam.  Information recorded by the scribe was done at my direction and has been verified and validated by me.     Mari Pulliam  05/17/22 0735       Manav Farnsworth DO  05/19/22 [id number]

## 2022-05-17 NOTE — DISCHARGE INSTRUCTIONS
Take medications as directed.  Apply moist heat to affected area 20 minutes at a time 4 times daily.  Return for worsening symptoms.  Follow-up with your doctor in 2 to 3 days if no better.

## 2022-10-11 RX ORDER — ESOMEPRAZOLE MAGNESIUM 40 MG/1
40 CAPSULE, DELAYED RELEASE ORAL DAILY
Qty: 30 CAPSULE | Refills: 5 | OUTPATIENT
Start: 2022-10-11

## 2022-10-11 NOTE — TELEPHONE ENCOUNTER
Patient needs an appointment for refills.  He will probably need to schedule with another provider in [place].

## 2022-10-21 RX ORDER — ESOMEPRAZOLE MAGNESIUM 40 MG/1
40 CAPSULE, DELAYED RELEASE ORAL DAILY
Qty: 30 CAPSULE | Refills: 5 | Status: SHIPPED | OUTPATIENT
Start: 2022-10-21 | End: 2022-11-04 | Stop reason: SDUPTHER

## 2022-10-21 NOTE — TELEPHONE ENCOUNTER
Patient called regarding medication. Was a patient of [facility]. Ran out of medication last Saturday 10/15/2022 and is in need of medication before new appointment on 11/4/2022. Was wanting to know if he could get it filled before appointment. [phone]. Please advise.

## 2022-10-25 ENCOUNTER — TELEPHONE (OUTPATIENT)
Dept: FAMILY MEDICINE CLINIC | Facility: CLINIC | Age: 47
End: 2022-10-25

## 2022-11-04 ENCOUNTER — OFFICE VISIT (OUTPATIENT)
Dept: FAMILY MEDICINE CLINIC | Facility: CLINIC | Age: 47
End: 2022-11-04

## 2022-11-04 VITALS
TEMPERATURE: 98.1 F | SYSTOLIC BLOOD PRESSURE: 112 MMHG | WEIGHT: 196 LBS | HEIGHT: 68 IN | DIASTOLIC BLOOD PRESSURE: 72 MMHG | HEART RATE: 77 BPM | BODY MASS INDEX: 29.7 KG/M2 | OXYGEN SATURATION: 99 %

## 2022-11-04 DIAGNOSIS — J45.20 MILD INTERMITTENT ASTHMA WITHOUT COMPLICATION: ICD-10-CM

## 2022-11-04 DIAGNOSIS — K21.9 HIATAL HERNIA WITH GASTROESOPHAGEAL REFLUX: ICD-10-CM

## 2022-11-04 DIAGNOSIS — E78.2 MIXED HYPERLIPIDEMIA: Primary | ICD-10-CM

## 2022-11-04 DIAGNOSIS — F32.A ANXIETY AND DEPRESSION: ICD-10-CM

## 2022-11-04 DIAGNOSIS — K44.9 HIATAL HERNIA WITH GASTROESOPHAGEAL REFLUX: ICD-10-CM

## 2022-11-04 DIAGNOSIS — J30.2 SEASONAL ALLERGIC RHINITIS, UNSPECIFIED TRIGGER: ICD-10-CM

## 2022-11-04 DIAGNOSIS — F41.9 ANXIETY AND DEPRESSION: ICD-10-CM

## 2022-11-04 LAB
ALBUMIN SERPL-MCNC: 4.3 G/DL (ref 3.5–5.2)
ALBUMIN/GLOB SERPL: 1.8 G/DL
ALP SERPL-CCNC: 65 U/L (ref 39–117)
ALT SERPL W P-5'-P-CCNC: 14 U/L (ref 1–41)
ANION GAP SERPL CALCULATED.3IONS-SCNC: 11.5 MMOL/L (ref 5–15)
AST SERPL-CCNC: 16 U/L (ref 1–40)
BASOPHILS # BLD AUTO: 0.06 10*3/MM3 (ref 0–0.2)
BASOPHILS NFR BLD AUTO: 0.9 % (ref 0–1.5)
BILIRUB SERPL-MCNC: 0.5 MG/DL (ref 0–1.2)
BUN SERPL-MCNC: 11 MG/DL (ref 6–20)
BUN/CREAT SERPL: 9.2 (ref 7–25)
CALCIUM SPEC-SCNC: 9.9 MG/DL (ref 8.6–10.5)
CHLORIDE SERPL-SCNC: 103 MMOL/L (ref 98–107)
CHOLEST SERPL-MCNC: 179 MG/DL (ref 0–200)
CO2 SERPL-SCNC: 25.5 MMOL/L (ref 22–29)
CREAT SERPL-MCNC: 1.2 MG/DL (ref 0.76–1.27)
DEPRECATED RDW RBC AUTO: 38.5 FL (ref 37–54)
EGFRCR SERPLBLD CKD-EPI 2021: 75.5 ML/MIN/1.73
EOSINOPHIL # BLD AUTO: 0.52 10*3/MM3 (ref 0–0.4)
EOSINOPHIL NFR BLD AUTO: 7.9 % (ref 0.3–6.2)
ERYTHROCYTE [DISTWIDTH] IN BLOOD BY AUTOMATED COUNT: 11.9 % (ref 12.3–15.4)
GLOBULIN UR ELPH-MCNC: 2.4 GM/DL
GLUCOSE SERPL-MCNC: 86 MG/DL (ref 65–99)
HCT VFR BLD AUTO: 45 % (ref 37.5–51)
HDLC SERPL-MCNC: 42 MG/DL (ref 40–60)
HGB BLD-MCNC: 15.2 G/DL (ref 13–17.7)
IMM GRANULOCYTES # BLD AUTO: 0.01 10*3/MM3 (ref 0–0.05)
IMM GRANULOCYTES NFR BLD AUTO: 0.2 % (ref 0–0.5)
LDLC SERPL CALC-MCNC: 102 MG/DL (ref 0–100)
LDLC/HDLC SERPL: 2.3 {RATIO}
LYMPHOCYTES # BLD AUTO: 1.58 10*3/MM3 (ref 0.7–3.1)
LYMPHOCYTES NFR BLD AUTO: 24.1 % (ref 19.6–45.3)
MCH RBC QN AUTO: 29.9 PG (ref 26.6–33)
MCHC RBC AUTO-ENTMCNC: 33.8 G/DL (ref 31.5–35.7)
MCV RBC AUTO: 88.4 FL (ref 79–97)
MONOCYTES # BLD AUTO: 0.41 10*3/MM3 (ref 0.1–0.9)
MONOCYTES NFR BLD AUTO: 6.3 % (ref 5–12)
NEUTROPHILS NFR BLD AUTO: 3.97 10*3/MM3 (ref 1.7–7)
NEUTROPHILS NFR BLD AUTO: 60.6 % (ref 42.7–76)
NRBC BLD AUTO-RTO: 0 /100 WBC (ref 0–0.2)
PLATELET # BLD AUTO: 292 10*3/MM3 (ref 140–450)
PMV BLD AUTO: 9.7 FL (ref 6–12)
POTASSIUM SERPL-SCNC: 4.7 MMOL/L (ref 3.5–5.2)
PROT SERPL-MCNC: 6.7 G/DL (ref 6–8.5)
RBC # BLD AUTO: 5.09 10*6/MM3 (ref 4.14–5.8)
SODIUM SERPL-SCNC: 140 MMOL/L (ref 136–145)
TRIGL SERPL-MCNC: 203 MG/DL (ref 0–150)
VLDLC SERPL-MCNC: 35 MG/DL (ref 5–40)
WBC NRBC COR # BLD: 6.55 10*3/MM3 (ref 3.4–10.8)

## 2022-11-04 PROCEDURE — 80061 LIPID PANEL: CPT

## 2022-11-04 PROCEDURE — 99214 OFFICE O/P EST MOD 30 MIN: CPT

## 2022-11-04 PROCEDURE — 80053 COMPREHEN METABOLIC PANEL: CPT

## 2022-11-04 PROCEDURE — 85025 COMPLETE CBC W/AUTO DIFF WBC: CPT

## 2022-11-04 RX ORDER — CYCLOBENZAPRINE HCL 10 MG
10 TABLET ORAL 3 TIMES DAILY PRN
Qty: 90 TABLET | Refills: 0 | Status: SHIPPED | OUTPATIENT
Start: 2022-11-04 | End: 2022-11-04

## 2022-11-04 RX ORDER — VORTIOXETINE 10 MG/1
10 TABLET, FILM COATED ORAL DAILY
COMMUNITY
Start: 2022-10-27

## 2022-11-04 RX ORDER — ALBUTEROL SULFATE 90 UG/1
2 AEROSOL, METERED RESPIRATORY (INHALATION)
Qty: 18 G | Refills: 3 | Status: SHIPPED | OUTPATIENT
Start: 2022-11-04

## 2022-11-04 RX ORDER — ESOMEPRAZOLE MAGNESIUM 40 MG/1
40 CAPSULE, DELAYED RELEASE ORAL DAILY
Qty: 30 CAPSULE | Refills: 5 | Status: SHIPPED | OUTPATIENT
Start: 2022-11-04

## 2022-11-04 NOTE — PROGRESS NOTES
Chief Complaint  Chief Complaint   Patient presents with   • Depression       Subjective      Subhash Barba presents to [facility]  History of Present Illness  Patient presents today for transition of care from previous PCP Marietta Garber.    He is following up on hyperlipidemia. He takes Lipitor 20 mg daily. He was initially prescribed Lipitor 40 mg but experienced body aches, muscle cramps, so he has been breaking it in half and taking 20 mg daily per PCP guidance.     He has a hiatal hernia and associated GERD. He takes Nexium 40 mg daily and reports this is effective management for his symptoms.    Depression and anxiety are well managed on Trintellix 10 mg daily. This is prescribed to him by psychiatry.     He has asthma for which he uses an albuterol inhaler as needed for wheezing and takes Singulair 10 mg every night.  He reports that over the last 3 to 4 months he has had an increase in congestion and wheezing in the mornings so he has been using the albuterol more frequently.      Objective     Medical History:  Past Medical History:   Diagnosis Date   • Anxiety    • Asthma    • Broken bones    • Head injuries    • Hiatal hernia with gastroesophageal reflux 03/03/2021   • Reflux esophagitis      History reviewed. No pertinent surgical history.   Social History     Tobacco Use   • Smoking status: Former     Packs/day: 0.50     Years: 10.00     Pack years: 5.00     Types: Cigarettes     Start date: 1992     Quit date: 2019     Years since quitting: 3.8   • Smokeless tobacco: Never   Vaping Use   • Vaping Use: Never used   Substance Use Topics   • Alcohol use: Never   • Drug use: Never     Family History   Problem Relation Age of Onset   • Diabetes Mother        Medications:  Prior to Admission medications    Medication Sig Start Date End Date Taking? Authorizing Provider   albuterol sulfate  (90 Base) MCG/ACT inhaler Inhale 1-2 puffs Every SIX Hours As Needed  for Wheezing OR Shortness OF Air for UP TO 30 DAYS 8/31/21  Yes ProviderTucker MD   atorvastatin (LIPITOR) 40 MG tablet Take 1 tablet by mouth every night at bedtime. 4/13/22  Yes Marietta Garber APRN   chlorpheniramine (CHLOR-TRIMETON) 4 MG tablet Take 1 tablet by mouth Every 4 (Four) Hours As Needed for Allergies. 4/13/22  Yes Marietta Garber APRN   cyclobenzaprine (FLEXERIL) 10 MG tablet Take 1 tablet by mouth 3 (Three) Times a Day As Needed for Muscle Spasms (back pain). 4/13/22  Yes Marietta Garber APRN   cyclobenzaprine (FLEXERIL) 10 MG tablet Take 1 tablet by mouth 3 (Three) Times a Day As Needed (Pain or muscle spasm). 5/17/22  Yes Manav Farnsworth,    diflunisal (DOLOBID) 500 MG tablet tablet Take 1 tablet by mouth 2 (Two) Times a Day As Needed (Pain). 5/17/22  Yes Manav Farnsworth,    esomeprazole (nexIUM) 40 MG capsule Take 1 capsule by mouth Daily. 10/21/22  Yes Yasmine Pitts APRN   fluticasone (Flonase) 50 MCG/ACT nasal spray 2 sprays into the nostril(s) as directed by provider Daily. 4/13/22  Yes Marietta Garber APRN   meloxicam (MOBIC) 15 MG tablet Take 1 tablet by mouth Every Morning. 4/13/22  Yes Marietta Garber APRN   montelukast (Singulair) 10 MG tablet Take 1 tablet by mouth Every Night. 9/3/21  Yes Marietta Garber APRN   Trintellix 10 MG tablet tablet Take 1 tablet by mouth Daily. 10/27/22  Yes ProviderTucker MD   sildenafil (Viagra) 50 MG tablet Take 1 tablet by mouth Daily As Needed for Erectile Dysfunction for up to 90 days. 6/8/21 9/6/21  Marietta Garber APRN   sertraline (ZOLOFT) 100 MG tablet Take 1 tablet by mouth Daily. 9/3/21 11/4/22  Marietta Garber APRN        Allergies:   Patient has no known allergies.    Health Maintenance Due   Topic Date Due   • COLORECTAL CANCER SCREENING  Never done   • COVID-19 Vaccine (1) Never done   • TDAP/TD VACCINES (1 - Tdap) Never done   • INFLUENZA VACCINE  Never done  "        Vital Signs:   /72   Pulse 77   Temp 98.1 °F (36.7 °C)   Ht 172.7 cm (68\")   Wt 88.9 kg (196 lb)   SpO2 99%   BMI 29.80 kg/m²     Wt Readings from Last 3 Encounters:   11/04/22 88.9 kg (196 lb)   05/17/22 86.1 kg (189 lb 13.1 oz)   04/13/22 84.8 kg (187 lb)     BP Readings from Last 3 Encounters:   11/04/22 112/72   05/17/22 136/97   04/13/22 112/82       BMI is >= 25 and <30. (Overweight) The following options were offered after discussion;: exercise counseling/recommendations and nutrition counseling/recommendations       Physical Exam  Vitals reviewed.   Constitutional:       Appearance: Normal appearance. He is well-developed.   HENT:      Head: Normocephalic and atraumatic.   Eyes:      Conjunctiva/sclera: Conjunctivae normal.      Pupils: Pupils are equal, round, and reactive to light.   Cardiovascular:      Rate and Rhythm: Normal rate and regular rhythm.      Heart sounds: No murmur heard.    No friction rub. No gallop.   Pulmonary:      Effort: Pulmonary effort is normal.      Breath sounds: Normal breath sounds. No wheezing or rhonchi.   Abdominal:      General: Bowel sounds are normal. There is no distension.      Palpations: Abdomen is soft.      Tenderness: There is no abdominal tenderness.   Skin:     General: Skin is warm and dry.   Neurological:      Mental Status: He is alert and oriented to person, place, and time.      Cranial Nerves: No cranial nerve deficit.   Psychiatric:         Mood and Affect: Mood and affect normal.         Behavior: Behavior normal.         Thought Content: Thought content normal.         Judgment: Judgment normal.          Result Review :    The following data was reviewed by WENDY Hanson on 11/04/22 at 08:19 EDT:    Common labs    Common Labs 4/13/22 4/13/22 4/13/22 5/17/22 5/17/22    0812 0812 0812 0650 0650   Glucose   95  103 (A)   BUN   10  10   Creatinine   1.18  1.01   Sodium   138  140   Potassium   4.5  4.1   Chloride   102  101 "   Calcium   9.7  10.4   Albumin   4.90  5.00   Total Bilirubin   0.5  0.4   Alkaline Phosphatase   64  71   AST (SGOT)   24  22   ALT (SGPT)   17  26   WBC 7.34   9.28    Hemoglobin 16.2   16.6    Hematocrit 48.1   50.6    Platelets 245   263    Total Cholesterol  182      Triglycerides  148      HDL Cholesterol  38 (A)      LDL Cholesterol   118 (A)      (A) Abnormal value            CMP    CMP 4/13/22 5/17/22   Glucose 95 103 (A)   BUN 10 10   Creatinine 1.18 1.01   Sodium 138 140   Potassium 4.5 4.1   Chloride 102 101   Calcium 9.7 10.4   Albumin 4.90 5.00   Total Bilirubin 0.5 0.4   Alkaline Phosphatase 64 71   AST (SGOT) 24 22   ALT (SGPT) 17 26   (A) Abnormal value            CBC w/diff    CBC w/Diff 4/13/22 5/17/22   WBC 7.34 9.28   RBC 5.32 5.49   Hemoglobin 16.2 16.6   Hematocrit 48.1 50.6   MCV 90.4 92.2   MCH 30.5 30.2   MCHC 33.7 32.8   RDW 12.6 12.7   Platelets 245 263   Neutrophil Rel % 63.2 64.6   Immature Granulocyte Rel % 0.1 0.3   Lymphocyte Rel % 23.8 22.3   Monocyte Rel % 7.2 6.9   Eosinophil Rel % 4.9 5.1   Basophil Rel % 0.8 0.8           Lipid Panel    Lipid Panel 4/13/22   Total Cholesterol 182   Triglycerides 148   HDL Cholesterol 38 (A)   VLDL Cholesterol 26   LDL Cholesterol  118 (A)   LDL/HDL Ratio 3.01   (A) Abnormal value              No Images in the past 120 days found..                  Assessment and Plan    Diagnoses and all orders for this visit:    1. Mixed hyperlipidemia (Primary)  Comments:  Lipids will be checked today.  If they remain elevated he will be started on an alternative medication.  Assessment & Plan:  Lipid abnormalities are newly identified.  Nutritional counseling was provided., Pharmacotherapy as ordered. and Treatment will be adjusted as indicated by lab work being done today.  Lipids will be reassessed in 3 months.    Orders:  -     CBC & Differential  -     Comprehensive metabolic panel  -     Lipid Panel    2. Anxiety and depression  Comments:  Continue use  of Trintellix as depression and anxiety symptoms are improving.  Follow-up with psychiatry.  Assessment & Plan:  Patient's depression is recurrent and is mild without psychosis. Their depression is currently in partial remission and the condition is improving with treatment. This will be reassessed at the next regular appointment. F/U as described:patient will continue current medication therapy and Follow-up with psychiatry..      3. Hiatal hernia with gastroesophageal reflux  Comments:  Continue use of Nexium 40 mg daily for relief of GERD symptoms  Orders:  -     esomeprazole (nexIUM) 40 MG capsule; Take 1 capsule by mouth Daily.  Dispense: 30 capsule; Refill: 5    4. Mild intermittent asthma without complication  Comments:  Begin use of Flonase daily to decrease congestion and decrease symptoms of wheezing related to congestion and allergic rhinitis.  Assessment & Plan:  Asthma is worsening.  The patient is experiencing frequent daytime asthma symptoms. He is experiencing no nighttime asthma symptoms.  Patient to keep asthma diary.  Discussed monitoring symptoms and use of quick-relief medications and contacting us early in the course of exacerbations.        Orders:  -     albuterol sulfate  (90 Base) MCG/ACT inhaler; Inhale 2 puffs 4 (Four) Times a Day.  Dispense: 18 g; Refill: 3    5. Seasonal allergic rhinitis, unspecified trigger  Comments:  Begin use of Flonase daily to decrease congestion and decrease symptoms of wheezing related to congestion and allergic rhinitis.    Other orders  -     Discontinue: cyclobenzaprine (FLEXERIL) 10 MG tablet; Take 1 tablet by mouth 3 (Three) Times a Day As Needed for Muscle Spasms (back pain).  Dispense: 90 tablet; Refill: 0           Smoking Cessation:    Subhash Barba  reports that he quit smoking about 3 years ago. His smoking use included cigarettes. He started smoking about 30 years ago. He has a 5.00 pack-year smoking history. He has never used smokeless  tobacco.          Follow Up   Return in about 3 months (around 2/4/2023) for F/u on asthma, hyperlipidemia.  Patient was given instructions and counseling regarding his condition or for health maintenance advice. Please see specific information pulled into the AVS if appropriate.     Please note that portions of this note were completed with a voice recognition program.

## 2022-11-04 NOTE — ASSESSMENT & PLAN NOTE
Asthma is worsening.  The patient is experiencing frequent daytime asthma symptoms. He is experiencing no nighttime asthma symptoms.  Patient to keep asthma diary.  Discussed monitoring symptoms and use of quick-relief medications and contacting us early in the course of exacerbations.

## 2022-11-04 NOTE — ASSESSMENT & PLAN NOTE
Lipid abnormalities are newly identified.  Nutritional counseling was provided., Pharmacotherapy as ordered. and Treatment will be adjusted as indicated by lab work being done today.  Lipids will be reassessed in 3 months.

## 2022-11-04 NOTE — ASSESSMENT & PLAN NOTE
Patient's depression is recurrent and is mild without psychosis. Their depression is currently in partial remission and the condition is improving with treatment. This will be reassessed at the next regular appointment. F/U as described:patient will continue current medication therapy and Follow-up with psychiatry..

## 2023-04-10 DIAGNOSIS — J45.20 MILD INTERMITTENT ASTHMA WITHOUT COMPLICATION: ICD-10-CM

## 2023-04-10 RX ORDER — ALBUTEROL SULFATE 90 UG/1
AEROSOL, METERED RESPIRATORY (INHALATION)
Qty: 18 G | Refills: 3 | Status: SHIPPED | OUTPATIENT
Start: 2023-04-10

## 2023-05-24 DIAGNOSIS — K21.9 HIATAL HERNIA WITH GASTROESOPHAGEAL REFLUX: ICD-10-CM

## 2023-05-24 DIAGNOSIS — K44.9 HIATAL HERNIA WITH GASTROESOPHAGEAL REFLUX: ICD-10-CM

## 2023-05-24 RX ORDER — ESOMEPRAZOLE MAGNESIUM 40 MG/1
40 CAPSULE, DELAYED RELEASE ORAL DAILY
Qty: 30 CAPSULE | Refills: 5 | Status: SHIPPED | OUTPATIENT
Start: 2023-05-24

## 2023-10-13 RX ORDER — ATORVASTATIN CALCIUM 40 MG/1
40 TABLET, FILM COATED ORAL
Qty: 90 TABLET | Refills: 1 | OUTPATIENT
Start: 2023-10-13

## 2023-11-01 ENCOUNTER — PATIENT MESSAGE (OUTPATIENT)
Dept: FAMILY MEDICINE CLINIC | Facility: CLINIC | Age: 48
End: 2023-11-01
Payer: COMMERCIAL

## 2023-11-01 RX ORDER — ATORVASTATIN CALCIUM 40 MG/1
40 TABLET, FILM COATED ORAL
Qty: 90 TABLET | Refills: 1 | Status: SHIPPED | OUTPATIENT
Start: 2023-11-01

## 2023-11-01 NOTE — TELEPHONE ENCOUNTER
From: Subhash Barba  To: Yasmine Pitts  Sent: 11/1/2023 12:29 PM EDT  Subject: Lipitor Refill    Good Morning. I just took my last Lipitor for my cholesterol and was hoping you could send a refill to my pharmacy. I use the [facility] in [place]. If you could do this for me so I don’t miss a dose, I would greatly appreciate it.     Thanks,    Subhash Barba

## 2023-11-21 DIAGNOSIS — K21.9 HIATAL HERNIA WITH GASTROESOPHAGEAL REFLUX: ICD-10-CM

## 2023-11-21 DIAGNOSIS — K44.9 HIATAL HERNIA WITH GASTROESOPHAGEAL REFLUX: ICD-10-CM

## 2023-11-21 RX ORDER — ESOMEPRAZOLE MAGNESIUM 40 MG/1
40 CAPSULE, DELAYED RELEASE ORAL DAILY
Qty: 30 CAPSULE | Refills: 5 | OUTPATIENT
Start: 2023-11-21

## 2023-12-06 ENCOUNTER — OFFICE VISIT (OUTPATIENT)
Dept: FAMILY MEDICINE CLINIC | Facility: CLINIC | Age: 48
End: 2023-12-06
Payer: COMMERCIAL

## 2023-12-06 VITALS
DIASTOLIC BLOOD PRESSURE: 80 MMHG | WEIGHT: 199.7 LBS | HEIGHT: 68 IN | BODY MASS INDEX: 30.26 KG/M2 | OXYGEN SATURATION: 94 % | SYSTOLIC BLOOD PRESSURE: 114 MMHG | HEART RATE: 76 BPM | TEMPERATURE: 98.9 F

## 2023-12-06 DIAGNOSIS — R07.9 INTERMITTENT CHEST PAIN: ICD-10-CM

## 2023-12-06 DIAGNOSIS — E78.2 MIXED HYPERLIPIDEMIA: ICD-10-CM

## 2023-12-06 DIAGNOSIS — Z00.00 ANNUAL PHYSICAL EXAM: Primary | ICD-10-CM

## 2023-12-06 DIAGNOSIS — F32.A ANXIETY AND DEPRESSION: ICD-10-CM

## 2023-12-06 DIAGNOSIS — F41.9 ANXIETY AND DEPRESSION: ICD-10-CM

## 2023-12-06 DIAGNOSIS — Z12.11 COLON CANCER SCREENING: ICD-10-CM

## 2023-12-06 DIAGNOSIS — J45.20 MILD INTERMITTENT ASTHMA WITHOUT COMPLICATION: ICD-10-CM

## 2023-12-06 LAB
ALBUMIN SERPL-MCNC: 4.8 G/DL (ref 3.5–5.2)
ALBUMIN/GLOB SERPL: 1.8 G/DL
ALP SERPL-CCNC: 74 U/L (ref 39–117)
ALT SERPL W P-5'-P-CCNC: 20 U/L (ref 1–41)
ANION GAP SERPL CALCULATED.3IONS-SCNC: 12 MMOL/L (ref 5–15)
AST SERPL-CCNC: 17 U/L (ref 1–40)
BASOPHILS # BLD AUTO: 0.06 10*3/MM3 (ref 0–0.2)
BASOPHILS NFR BLD AUTO: 0.7 % (ref 0–1.5)
BILIRUB SERPL-MCNC: 0.6 MG/DL (ref 0–1.2)
BUN SERPL-MCNC: 11 MG/DL (ref 6–20)
BUN/CREAT SERPL: 8 (ref 7–25)
CALCIUM SPEC-SCNC: 9.9 MG/DL (ref 8.6–10.5)
CHLORIDE SERPL-SCNC: 101 MMOL/L (ref 98–107)
CHOLEST SERPL-MCNC: 207 MG/DL (ref 0–200)
CO2 SERPL-SCNC: 26 MMOL/L (ref 22–29)
CREAT SERPL-MCNC: 1.37 MG/DL (ref 0.76–1.27)
DEPRECATED RDW RBC AUTO: 38.2 FL (ref 37–54)
EGFRCR SERPLBLD CKD-EPI 2021: 64 ML/MIN/1.73
EOSINOPHIL # BLD AUTO: 0.38 10*3/MM3 (ref 0–0.4)
EOSINOPHIL NFR BLD AUTO: 4.4 % (ref 0.3–6.2)
ERYTHROCYTE [DISTWIDTH] IN BLOOD BY AUTOMATED COUNT: 13 % (ref 12.3–15.4)
GLOBULIN UR ELPH-MCNC: 2.6 GM/DL
GLUCOSE SERPL-MCNC: 75 MG/DL (ref 65–99)
HCT VFR BLD AUTO: 48.6 % (ref 37.5–51)
HDLC SERPL-MCNC: 42 MG/DL (ref 40–60)
HGB BLD-MCNC: 16 G/DL (ref 13–17.7)
IMM GRANULOCYTES # BLD AUTO: 0.03 10*3/MM3 (ref 0–0.05)
IMM GRANULOCYTES NFR BLD AUTO: 0.3 % (ref 0–0.5)
LDLC SERPL CALC-MCNC: 117 MG/DL (ref 0–100)
LDLC/HDLC SERPL: 2.62 {RATIO}
LYMPHOCYTES # BLD AUTO: 1.73 10*3/MM3 (ref 0.7–3.1)
LYMPHOCYTES NFR BLD AUTO: 20 % (ref 19.6–45.3)
MCH RBC QN AUTO: 27 PG (ref 26.6–33)
MCHC RBC AUTO-ENTMCNC: 32.9 G/DL (ref 31.5–35.7)
MCV RBC AUTO: 82.1 FL (ref 79–97)
MONOCYTES # BLD AUTO: 0.56 10*3/MM3 (ref 0.1–0.9)
MONOCYTES NFR BLD AUTO: 6.5 % (ref 5–12)
NEUTROPHILS NFR BLD AUTO: 5.91 10*3/MM3 (ref 1.7–7)
NEUTROPHILS NFR BLD AUTO: 68.1 % (ref 42.7–76)
NRBC BLD AUTO-RTO: 0 /100 WBC (ref 0–0.2)
PLATELET # BLD AUTO: 309 10*3/MM3 (ref 140–450)
PMV BLD AUTO: 9.3 FL (ref 6–12)
POTASSIUM SERPL-SCNC: 4.3 MMOL/L (ref 3.5–5.2)
PROT SERPL-MCNC: 7.4 G/DL (ref 6–8.5)
RBC # BLD AUTO: 5.92 10*6/MM3 (ref 4.14–5.8)
SODIUM SERPL-SCNC: 139 MMOL/L (ref 136–145)
TRIGL SERPL-MCNC: 274 MG/DL (ref 0–150)
TSH SERPL DL<=0.05 MIU/L-ACNC: 1.03 UIU/ML (ref 0.27–4.2)
VLDLC SERPL-MCNC: 48 MG/DL (ref 5–40)
WBC NRBC COR # BLD AUTO: 8.67 10*3/MM3 (ref 3.4–10.8)

## 2023-12-06 PROCEDURE — 85025 COMPLETE CBC W/AUTO DIFF WBC: CPT

## 2023-12-06 PROCEDURE — 84443 ASSAY THYROID STIM HORMONE: CPT

## 2023-12-06 PROCEDURE — 80053 COMPREHEN METABOLIC PANEL: CPT

## 2023-12-06 PROCEDURE — 80061 LIPID PANEL: CPT

## 2023-12-06 RX ORDER — HYDROXYZINE PAMOATE 50 MG/1
1 CAPSULE ORAL 3 TIMES DAILY
COMMUNITY
Start: 2023-11-21

## 2023-12-06 RX ORDER — MIRTAZAPINE 15 MG/1
15 TABLET, FILM COATED ORAL
COMMUNITY
Start: 2023-11-21

## 2023-12-06 NOTE — PROGRESS NOTES
"Chief Complaint  Chief Complaint   Patient presents with    Annual Exam    Hyperlipidemia    Anxiety    Depression    Chest Pain     X few weeks, feels like \"heart is being squeezed\"       Subjective      Subhash Barba presents to [facility]  The patient is a 48-year-old male who comes in today for annual physical exam and to follow up on hyperlipidemia, depression, anxiety, and asthma.    He has been having intermittent chest pains. He describes it as a squeezing sensation in the center of his chest. This has been going on for a couple of weeks. He also has a little bit of dizziness and lightheadedness. He denies any upper extremity pain. He is always anxious. He woke up in the middle of the night one night with chest pain. He is not sure if it is anxiety, or something related to his heart. His cousin  a few years ago from a myocardial infarction in his 40s. The pain resolves on its own. He denies any nausea, vomiting, or indigestion associated with it. He denies a history of hypertension or ever being treated with antihypertensive medications. He takes Nexium every day.    He is still taking his cholesterol medication. He had an echocardiogram 15 years ago, which was normal.     He is still seeing psychiatry at [facility]. He is no longer taking Trintellix. He had a bad panic attack while driving. He went back to Zoloft, which helped. He is taking Zoloft every day, mirtazapine at bedtime, and Vistaril as needed.     He smoked intermittently since he was in high school. He is not currently smoking.     His cousin  of a myocardial infarction in his 40s.      Objective     Medical History:  Past Medical History:   Diagnosis Date    Anxiety     Asthma     Broken bones     Head injuries     Hiatal hernia with gastroesophageal reflux 2021    Reflux esophagitis      No past surgical history on file.   Social History     Tobacco Use    Smoking status: Former     Packs/day: " 0.50     Years: 10.00     Additional pack years: 0.00     Total pack years: 5.00     Types: Cigarettes     Start date:      Quit date: 2019     Years since quittin.9    Smokeless tobacco: Never   Vaping Use    Vaping Use: Never used   Substance Use Topics    Alcohol use: Never    Drug use: Never     Family History   Problem Relation Age of Onset    Diabetes Mother        Medications:  Prior to Admission medications    Medication Sig Start Date End Date Taking? Authorizing Provider   atorvastatin (LIPITOR) 40 MG tablet Take 1 tablet by mouth every night at bedtime. 23  Yes Yasmine Pitts APRN   esomeprazole (nexIUM) 40 MG capsule Take 1 capsule by mouth Daily. 23  Yes Yasmine Pitts APRN   fluticasone (Flonase) 50 MCG/ACT nasal spray 2 sprays into the nostril(s) as directed by provider Daily. 22  Yes Marietta Garber APRN   hydrOXYzine pamoate (VISTARIL) 50 MG capsule Take 1 capsule by mouth 3 times a day. 23  Yes ProviderTucker MD   mirtazapine (REMERON) 15 MG tablet Take 1 tablet by mouth every night at bedtime. 23  Yes ProviderTucker MD   montelukast (Singulair) 10 MG tablet Take 1 tablet by mouth Every Night. 9/3/21  Yes Marietta Garber APRN   sertraline (ZOLOFT) 50 MG tablet Take 1 tablet by mouth Daily. 23  Yes ProviderTucker MD   Trintellix 10 MG tablet tablet Take 1 tablet by mouth Daily. 10/27/22  Yes ProviderTucker MD   Ventolin  (90 Base) MCG/ACT inhaler inhale TWO puffs BY MOUTH FOUR TIMES DAILY 4/10/23  Yes Yasmine Pitts APRN   chlorpheniramine (CHLOR-TRIMETON) 4 MG tablet Take 1 tablet by mouth Every 4 (Four) Hours As Needed for Allergies. 22   Marietta Garber APRN   sildenafil (Viagra) 50 MG tablet Take 1 tablet by mouth Daily As Needed for Erectile Dysfunction for up to 90 days. 21  Marietta Garber APRN        Allergies:   Patient has no known  "allergies.    Health Maintenance Due   Topic Date Due    COLORECTAL CANCER SCREENING  Never done    COVID-19 Vaccine (1) Never done    Pneumococcal Vaccine 0-64 (1 - PCV) Never done    TDAP/TD VACCINES (1 - Tdap) Never done    ANNUAL PHYSICAL  04/13/2023    BMI FOLLOWUP  11/04/2023         Vital Signs:   /80 (BP Location: Right arm, Patient Position: Sitting, Cuff Size: Adult)   Pulse 76   Temp 98.9 °F (37.2 °C) (Oral)   Ht 172.7 cm (68\")   Wt 90.6 kg (199 lb 11.2 oz)   SpO2 94%   BMI 30.36 kg/m²     Wt Readings from Last 3 Encounters:   12/06/23 90.6 kg (199 lb 11.2 oz)   11/04/22 88.9 kg (196 lb)   05/17/22 86.1 kg (189 lb 13.1 oz)     BP Readings from Last 3 Encounters:   12/06/23 114/80   11/04/22 112/72   05/17/22 136/97       BMI is >= 30 and <35. (Class 1 Obesity). The following options were offered after discussion;: exercise counseling/recommendations and nutrition counseling/recommendations       Physical Exam  Vitals reviewed.   Constitutional:       Appearance: Normal appearance. He is well-developed.   HENT:      Head: Normocephalic and atraumatic.   Eyes:      Conjunctiva/sclera: Conjunctivae normal.      Pupils: Pupils are equal, round, and reactive to light.   Cardiovascular:      Rate and Rhythm: Normal rate and regular rhythm.      Heart sounds: No murmur heard.     No friction rub. No gallop.   Pulmonary:      Effort: Pulmonary effort is normal.      Breath sounds: Normal breath sounds. No wheezing or rhonchi.   Abdominal:      General: Bowel sounds are normal. There is no distension.      Palpations: Abdomen is soft.      Tenderness: There is no abdominal tenderness.   Skin:     General: Skin is warm and dry.   Neurological:      Mental Status: He is alert and oriented to person, place, and time.      Cranial Nerves: No cranial nerve deficit.   Psychiatric:         Mood and Affect: Mood and affect normal.         Behavior: Behavior normal.         Thought Content: Thought content " normal.         Judgment: Judgment normal.           Result Review :    The following data was reviewed by WNEDY Hanson on 12/07/23 at 11:31 EST:        No Images in the past 120 days found..    EKG today shows normal sinus rhythm with a rate of 63, similar to previous EKG from 10/2019. There is suspicion for abnormal R wave progression.      ECG 12 Lead    Date/Time: 12/6/2023 3:14 PM  Performed by: Yasmine Pitts APRN    Authorized by: Yasmine Pitts APRN  Comparison: compared with previous ECG from 8/10/2019  Similar to previous ECG  Comparison to previous ECG: Sinus rhythm  Abnormal R-wave progression  Rhythm: sinus rhythm  Rate: normal  BPM: 63  Conduction: conduction normal  ST Segments: ST segments normal  QRS axis: normal    Clinical impression: abnormal EKG              Assessment and Plan    Diagnoses and all orders for this visit:    1. Annual physical exam (Primary)  -     CBC & Differential  -     Comprehensive Metabolic Panel  -     Lipid Panel  -     TSH Rfx On Abnormal To Free T4    2. Mixed hyperlipidemia  -     Lipid Panel    3. Anxiety and depression    4. Mild intermittent asthma without complication    5. Colon cancer screening  -     Cologuard - Stool, Per Rectum; Future    6. Intermittent chest pain  -     ECG 12 Lead  -     Adult Transthoracic Echo Complete W/ Cont if Necessary Per Protocol; Future       Chest pain.  The patient claims he had a previous echocardiogram, but I am unable to find these results. He will be referred for echocardiogram and sent to cardiology for further evaluation and possible stress test given recurrent intermittent squeezing chest pain and abnormal EKG today. Will order routine laboratory studies.     Health maintenance  Will order Cologuard.         Smoking Cessation:    Subhash Barba  reports that he quit smoking about 4 years ago. His smoking use included cigarettes. He started smoking about 31 years ago. He has a 5.00  pack-year smoking history. He has never used smokeless tobacco.    Follow Up   No follow-ups on file.  Patient was given instructions and counseling regarding his condition or for health maintenance advice. Please see specific information pulled into the AVS if appropriate.     Please note that portions of this note were completed with a voice recognition program.    Transcribed from ambient dictation for WENDY Hanson by Lorena Pineda.  12/06/23   16:17 EST    Patient or patient representative verbalized consent to the visit recording.  I have personally performed the services described in this document as transcribed by the above individual, and it is both accurate and complete.

## 2023-12-13 DIAGNOSIS — K44.9 HIATAL HERNIA WITH GASTROESOPHAGEAL REFLUX: ICD-10-CM

## 2023-12-13 DIAGNOSIS — J45.20 MILD INTERMITTENT ASTHMA WITHOUT COMPLICATION: ICD-10-CM

## 2023-12-13 DIAGNOSIS — K21.9 HIATAL HERNIA WITH GASTROESOPHAGEAL REFLUX: ICD-10-CM

## 2023-12-13 RX ORDER — ESOMEPRAZOLE MAGNESIUM 40 MG/1
40 CAPSULE, DELAYED RELEASE ORAL DAILY
Qty: 30 CAPSULE | Refills: 5 | Status: SHIPPED | OUTPATIENT
Start: 2023-12-13

## 2023-12-13 RX ORDER — ALBUTEROL SULFATE 90 UG/1
2 AEROSOL, METERED RESPIRATORY (INHALATION) EVERY 6 HOURS PRN
Qty: 18 G | Refills: 3 | Status: SHIPPED | OUTPATIENT
Start: 2023-12-13

## 2024-01-22 ENCOUNTER — HOSPITAL ENCOUNTER (OUTPATIENT)
Dept: CARDIOLOGY | Facility: HOSPITAL | Age: 49
Discharge: HOME | End: 2024-01-22
Payer: COMMERCIAL

## 2024-01-22 DIAGNOSIS — R07.9 INTERMITTENT CHEST PAIN: ICD-10-CM

## 2024-01-22 PROCEDURE — 93306 TTE W/DOPPLER COMPLETE: CPT

## 2024-01-26 DIAGNOSIS — R07.9 INTERMITTENT CHEST PAIN: Primary | ICD-10-CM

## 2024-01-26 LAB
BH CV ECHO MEAS - AO ROOT DIAM: 2.7 CM
BH CV ECHO MEAS - EDV(CUBED): 52.8 ML
BH CV ECHO MEAS - EDV(MOD-SP2): 50.6 ML
BH CV ECHO MEAS - EDV(MOD-SP4): 48.9 ML
BH CV ECHO MEAS - EF(MOD-BP): 62.7 %
BH CV ECHO MEAS - EF(MOD-SP2): 69.4 %
BH CV ECHO MEAS - EF(MOD-SP4): 58.1 %
BH CV ECHO MEAS - ESV(CUBED): 20.5 ML
BH CV ECHO MEAS - ESV(MOD-SP2): 15.5 ML
BH CV ECHO MEAS - ESV(MOD-SP4): 20.5 ML
BH CV ECHO MEAS - FS: 27.1 %
BH CV ECHO MEAS - IVS/LVPW: 0.85 CM
BH CV ECHO MEAS - IVSD: 1.13 CM
BH CV ECHO MEAS - LA DIMENSION: 3.5 CM
BH CV ECHO MEAS - LV DIASTOLIC VOL/BSA (35-75): 24.2 CM2
BH CV ECHO MEAS - LV MASS(C)D: 155.9 GRAMS
BH CV ECHO MEAS - LV SYSTOLIC VOL/BSA (12-30): 10.1 CM2
BH CV ECHO MEAS - LVIDD: 3.8 CM
BH CV ECHO MEAS - LVIDS: 2.7 CM
BH CV ECHO MEAS - LVPWD: 1.33 CM
BH CV ECHO MEAS - MV A MAX VEL: 81 CM/SEC
BH CV ECHO MEAS - MV DEC SLOPE: 544 CM/SEC2
BH CV ECHO MEAS - MV DEC TIME: 0.13 SEC
BH CV ECHO MEAS - MV E MAX VEL: 73.3 CM/SEC
BH CV ECHO MEAS - MV E/A: 0.91
BH CV ECHO MEAS - RVDD: 3.1 CM
BH CV ECHO MEAS - SI(MOD-SP2): 17.4 ML/M2
BH CV ECHO MEAS - SI(MOD-SP4): 14 ML/M2
BH CV ECHO MEAS - SV(MOD-SP2): 35.1 ML
BH CV ECHO MEAS - SV(MOD-SP4): 28.4 ML
BH CV ECHO MEAS - TAPSE (>1.6): 2.12 CM
IVRT: 55 MS
LEFT ATRIUM VOLUME INDEX: 16.1 ML/M2

## 2024-03-22 ENCOUNTER — PATIENT ROUNDING (BHMG ONLY) (OUTPATIENT)
Dept: CARDIOLOGY | Facility: CLINIC | Age: 49
End: 2024-03-22
Payer: COMMERCIAL

## 2024-03-22 NOTE — PROGRESS NOTES
**A [email or web address] message has been sent fot PATIENT ROUNDING with [facility] .

## 2024-06-20 DIAGNOSIS — K21.9 HIATAL HERNIA WITH GASTROESOPHAGEAL REFLUX: ICD-10-CM

## 2024-06-20 DIAGNOSIS — K44.9 HIATAL HERNIA WITH GASTROESOPHAGEAL REFLUX: ICD-10-CM

## 2024-06-20 RX ORDER — ESOMEPRAZOLE MAGNESIUM 40 MG/1
40 CAPSULE, DELAYED RELEASE ORAL DAILY
Qty: 30 CAPSULE | Refills: 5 | Status: SHIPPED | OUTPATIENT
Start: 2024-06-20

## 2024-08-23 DIAGNOSIS — J45.20 MILD INTERMITTENT ASTHMA WITHOUT COMPLICATION: ICD-10-CM

## 2024-08-23 RX ORDER — ALBUTEROL SULFATE 90 UG/1
2 AEROSOL, METERED RESPIRATORY (INHALATION) EVERY 6 HOURS PRN
Qty: 18 G | Refills: 0 | Status: SHIPPED | OUTPATIENT
Start: 2024-08-23

## 2024-09-11 ENCOUNTER — LAB (OUTPATIENT)
Dept: LAB | Facility: HOSPITAL | Age: 49
End: 2024-09-11
Payer: COMMERCIAL

## 2024-09-11 ENCOUNTER — OFFICE VISIT (OUTPATIENT)
Dept: FAMILY MEDICINE CLINIC | Facility: CLINIC | Age: 49
End: 2024-09-11
Payer: COMMERCIAL

## 2024-09-11 VITALS
WEIGHT: 204.3 LBS | TEMPERATURE: 98.6 F | HEIGHT: 68 IN | BODY MASS INDEX: 30.96 KG/M2 | HEART RATE: 81 BPM | DIASTOLIC BLOOD PRESSURE: 78 MMHG | OXYGEN SATURATION: 96 % | SYSTOLIC BLOOD PRESSURE: 126 MMHG

## 2024-09-11 DIAGNOSIS — M62.838 MUSCLE SPASM: ICD-10-CM

## 2024-09-11 DIAGNOSIS — R07.81 RIB PAIN ON RIGHT SIDE: ICD-10-CM

## 2024-09-11 DIAGNOSIS — M79.10 MYALGIA: ICD-10-CM

## 2024-09-11 DIAGNOSIS — R21 FACIAL RASH: ICD-10-CM

## 2024-09-11 DIAGNOSIS — R10.9 RIGHT FLANK PAIN: ICD-10-CM

## 2024-09-11 DIAGNOSIS — R10.9 RIGHT FLANK PAIN: Primary | ICD-10-CM

## 2024-09-11 LAB
ALBUMIN SERPL-MCNC: 5 G/DL (ref 3.5–5.2)
ALBUMIN UR-MCNC: 1.6 MG/DL
ALBUMIN/GLOB SERPL: 1.6 G/DL
ALP SERPL-CCNC: 95 U/L (ref 39–117)
ALT SERPL W P-5'-P-CCNC: 13 U/L (ref 1–41)
ANION GAP SERPL CALCULATED.3IONS-SCNC: 16 MMOL/L (ref 5–15)
AST SERPL-CCNC: 16 U/L (ref 1–40)
BASOPHILS # BLD AUTO: 0.07 10*3/MM3 (ref 0–0.2)
BASOPHILS NFR BLD AUTO: 0.9 % (ref 0–1.5)
BILIRUB SERPL-MCNC: 0.6 MG/DL (ref 0–1.2)
BILIRUB UR QL STRIP: NEGATIVE
BUN SERPL-MCNC: 19 MG/DL (ref 6–20)
BUN/CREAT SERPL: 16.1 (ref 7–25)
CALCIUM SPEC-SCNC: 10.2 MG/DL (ref 8.6–10.5)
CHLORIDE SERPL-SCNC: 99 MMOL/L (ref 98–107)
CHROMATIN AB SERPL-ACNC: <10 IU/ML (ref 0–14)
CLARITY UR: CLEAR
CO2 SERPL-SCNC: 23 MMOL/L (ref 22–29)
COLOR UR: YELLOW
CREAT SERPL-MCNC: 1.18 MG/DL (ref 0.76–1.27)
CREAT UR-MCNC: 118 MG/DL
CRP SERPL-MCNC: <0.3 MG/DL (ref 0–0.5)
DEPRECATED RDW RBC AUTO: 40.4 FL (ref 37–54)
EGFRCR SERPLBLD CKD-EPI 2021: 76.1 ML/MIN/1.73
EOSINOPHIL # BLD AUTO: 0.5 10*3/MM3 (ref 0–0.4)
EOSINOPHIL NFR BLD AUTO: 6.6 % (ref 0.3–6.2)
ERYTHROCYTE [DISTWIDTH] IN BLOOD BY AUTOMATED COUNT: 13.4 % (ref 12.3–15.4)
GLOBULIN UR ELPH-MCNC: 3.1 GM/DL
GLUCOSE SERPL-MCNC: 72 MG/DL (ref 65–99)
GLUCOSE UR STRIP-MCNC: NEGATIVE MG/DL
HCT VFR BLD AUTO: 47.2 % (ref 37.5–51)
HGB BLD-MCNC: 16 G/DL (ref 13–17.7)
HGB UR QL STRIP.AUTO: NEGATIVE
HOLD SPECIMEN: NORMAL
IMM GRANULOCYTES # BLD AUTO: 0.02 10*3/MM3 (ref 0–0.05)
IMM GRANULOCYTES NFR BLD AUTO: 0.3 % (ref 0–0.5)
KETONES UR QL STRIP: ABNORMAL
LEUKOCYTE ESTERASE UR QL STRIP.AUTO: NEGATIVE
LYMPHOCYTES # BLD AUTO: 1.73 10*3/MM3 (ref 0.7–3.1)
LYMPHOCYTES NFR BLD AUTO: 22.8 % (ref 19.6–45.3)
MCH RBC QN AUTO: 28.1 PG (ref 26.6–33)
MCHC RBC AUTO-ENTMCNC: 33.9 G/DL (ref 31.5–35.7)
MCV RBC AUTO: 83 FL (ref 79–97)
MICROALBUMIN/CREAT UR: 13.6 MG/G (ref 0–29)
MONOCYTES # BLD AUTO: 0.57 10*3/MM3 (ref 0.1–0.9)
MONOCYTES NFR BLD AUTO: 7.5 % (ref 5–12)
NEUTROPHILS NFR BLD AUTO: 4.69 10*3/MM3 (ref 1.7–7)
NEUTROPHILS NFR BLD AUTO: 61.9 % (ref 42.7–76)
NITRITE UR QL STRIP: NEGATIVE
NRBC BLD AUTO-RTO: 0 /100 WBC (ref 0–0.2)
PH UR STRIP.AUTO: 5.5 [PH] (ref 5–8)
PLATELET # BLD AUTO: 321 10*3/MM3 (ref 140–450)
PMV BLD AUTO: 10 FL (ref 6–12)
POTASSIUM SERPL-SCNC: 4.2 MMOL/L (ref 3.5–5.2)
PROT SERPL-MCNC: 8.1 G/DL (ref 6–8.5)
PROT UR QL STRIP: NEGATIVE
RBC # BLD AUTO: 5.69 10*6/MM3 (ref 4.14–5.8)
SODIUM SERPL-SCNC: 138 MMOL/L (ref 136–145)
SP GR UR STRIP: 1.02 (ref 1–1.03)
URATE SERPL-MCNC: 9.5 MG/DL (ref 3.4–7)
UROBILINOGEN UR QL STRIP: ABNORMAL
WBC NRBC COR # BLD AUTO: 7.58 10*3/MM3 (ref 3.4–10.8)

## 2024-09-11 PROCEDURE — 82570 ASSAY OF URINE CREATININE: CPT

## 2024-09-11 PROCEDURE — 86235 NUCLEAR ANTIGEN ANTIBODY: CPT

## 2024-09-11 PROCEDURE — 1125F AMNT PAIN NOTED PAIN PRSNT: CPT

## 2024-09-11 PROCEDURE — 86140 C-REACTIVE PROTEIN: CPT

## 2024-09-11 PROCEDURE — 86160 COMPLEMENT ANTIGEN: CPT

## 2024-09-11 PROCEDURE — 99214 OFFICE O/P EST MOD 30 MIN: CPT

## 2024-09-11 PROCEDURE — 85025 COMPLETE CBC W/AUTO DIFF WBC: CPT

## 2024-09-11 PROCEDURE — 84550 ASSAY OF BLOOD/URIC ACID: CPT

## 2024-09-11 PROCEDURE — 86038 ANTINUCLEAR ANTIBODIES: CPT

## 2024-09-11 PROCEDURE — 86431 RHEUMATOID FACTOR QUANT: CPT

## 2024-09-11 PROCEDURE — 86225 DNA ANTIBODY NATIVE: CPT

## 2024-09-11 PROCEDURE — 82043 UR ALBUMIN QUANTITATIVE: CPT

## 2024-09-11 PROCEDURE — 81003 URINALYSIS AUTO W/O SCOPE: CPT

## 2024-09-11 PROCEDURE — 36415 COLL VENOUS BLD VENIPUNCTURE: CPT

## 2024-09-11 PROCEDURE — 80053 COMPREHEN METABOLIC PANEL: CPT

## 2024-09-11 RX ORDER — CYCLOBENZAPRINE HCL 10 MG
10 TABLET ORAL 3 TIMES DAILY PRN
Qty: 30 TABLET | Refills: 0 | Status: SHIPPED | OUTPATIENT
Start: 2024-09-11

## 2024-09-11 RX ORDER — NYSTATIN AND TRIAMCINOLONE ACETONIDE 100000; 1 [USP'U]/G; MG/G
1 OINTMENT TOPICAL 2 TIMES DAILY
Qty: 60 G | Refills: 0 | Status: SHIPPED | OUTPATIENT
Start: 2024-09-11

## 2024-09-11 NOTE — PROGRESS NOTES
Chief Complaint  Chief Complaint   Patient presents with    Abdominal Pain     Pt c/o ongoing pain in right side of abdomen    Muscle cramps     Pt states he has been getting muscle cramps all over body after he does yard work x 6 weeks      Rash     Pt c/o rash on his cheeks. He states is has been happening for about 6 weeks.       Subjective      Subhash Barba presents to [facility]  History of Present Illness  The patient presents for evaluation of multiple medical concerns.    He has been experiencing pain on the right side below his rib cage for approximately 6 weeks, which occasionally radiates to his back. The pain is described as sharp and stabbing, particularly noticeable when he moves in certain ways or during coughing or sneezing. Despite attempts to alleviate the pain with ibuprofen and Tylenol, there has been no significant relief. He reports no associated fever, chills, or body aches, and he is not experiencing any shortness of breath. His bowel movements have become more regular, and he has increased his water intake to six 16-ounce bottles daily. He reports no changes in urination or any symptoms of dysuria, such as pain, burning, or hesitancy.    He has also been dealing with a persistent facial rash that started around the same time as his abdominal pain. The rash is primarily located on his cheeks and nose. Despite trying various skincare products and shaving his face daily, the rash has not improved. He reports no similar rashes elsewhere on his body.    Additionally, he has been experiencing muscle cramps in his stomach, side, and sometimes in his legs after working outside. He changed his diet about 3 months ago and cut sugars completely out. He does not drink soft drinks or put sugar in anything. He avoids any kind of sweets like desserts and started using honey instead of sugar. He also stopped eating potato chips and crackers. He gained 5 pounds since  changing his diet.      Objective     Medical History:  Past Medical History:   Diagnosis Date    Anxiety     Asthma     Broken bones     Head injuries     Hiatal hernia with gastroesophageal reflux 2021    Reflux esophagitis      No past surgical history on file.   Social History     Tobacco Use    Smoking status: Former     Current packs/day: 0.00     Average packs/day: 0.5 packs/day for 27.0 years (13.5 ttl pk-yrs)     Types: Cigarettes     Start date: 1992     Quit date: 2019     Years since quittin.6    Smokeless tobacco: Never   Vaping Use    Vaping status: Never Used   Substance Use Topics    Alcohol use: Never    Drug use: Never     Family History   Problem Relation Age of Onset    Diabetes Mother        Medications:  Prior to Admission medications    Medication Sig Start Date End Date Taking? Authorizing Provider   albuterol sulfate  (90 Base) MCG/ACT inhaler inhale TWO puffs BY MOUTH EVERY 6 HOURS AS NEEDED for wheezing 24  Yes Yasmine Pitts APRN   atorvastatin (LIPITOR) 40 MG tablet Take 1 tablet by mouth every night at bedtime. 23  Yes Yasmine Pitts APRN   esomeprazole (nexIUM) 40 MG capsule Take 1 capsule by mouth Daily. 24  Yes Yasmine Pitts APRN   hydrOXYzine pamoate (VISTARIL) 50 MG capsule Take 1 capsule by mouth 3 times a day. 23  Yes Tucker Caban MD   sertraline (ZOLOFT) 50 MG tablet Take 1 tablet by mouth Daily. 23  Yes ProviderTucker MD   fluticasone (Flonase) 50 MCG/ACT nasal spray 2 sprays into the nostril(s) as directed by provider Daily. 22   Marietta Garber APRN   mirtazapine (REMERON) 15 MG tablet Take 1 tablet by mouth every night at bedtime. 23   ProviderTucker MD   sildenafil (Viagra) 50 MG tablet Take 1 tablet by mouth Daily As Needed for Erectile Dysfunction for up to 90 days. 21  Marietta Garber APRN        Allergies:   Patient has no known  "allergies.    Health Maintenance Due   Topic Date Due    COLORECTAL CANCER SCREENING  Never done    Pneumococcal Vaccine 0-64 (1 of 2 - PCV) Never done    TDAP/TD VACCINES (1 - Tdap) Never done    COVID-19 Vaccine (1 - 2023-24 season) Never done         Vital Signs:   /78 (BP Location: Left arm, Patient Position: Sitting, Cuff Size: Adult)   Pulse 81   Temp 98.6 °F (37 °C) (Oral)   Ht 172.7 cm (68\")   Wt 92.7 kg (204 lb 4.8 oz)   SpO2 96%   BMI 31.06 kg/m²     Wt Readings from Last 3 Encounters:   09/11/24 92.7 kg (204 lb 4.8 oz)   12/06/23 90.6 kg (199 lb 11.2 oz)   11/04/22 88.9 kg (196 lb)     BP Readings from Last 3 Encounters:   09/11/24 126/78   12/06/23 114/80   11/04/22 112/72       Physical Exam  Vitals reviewed.   Constitutional:       Appearance: Normal appearance. He is well-developed. He is obese.   HENT:      Head: Normocephalic and atraumatic.   Eyes:      Conjunctiva/sclera: Conjunctivae normal.      Pupils: Pupils are equal, round, and reactive to light.   Cardiovascular:      Rate and Rhythm: Normal rate and regular rhythm.      Heart sounds: No murmur heard.     No friction rub. No gallop.   Pulmonary:      Effort: Pulmonary effort is normal.      Breath sounds: Normal breath sounds. No wheezing or rhonchi.   Chest:      Chest wall: Tenderness present.          Comments: Tenderness to right lower chest wall that radiates to the right flank area  Abdominal:      General: Bowel sounds are normal. There is no distension.      Palpations: Abdomen is soft.      Tenderness: There is no abdominal tenderness.   Skin:     General: Skin is warm and dry.      Findings: Rash present.   Neurological:      Mental Status: He is alert and oriented to person, place, and time.      Cranial Nerves: No cranial nerve deficit.   Psychiatric:         Mood and Affect: Mood and affect normal.         Behavior: Behavior normal.         Thought Content: Thought content normal.         Judgment: Judgment normal. "       Physical Exam        Result Review :    The following data was reviewed by WENDY Hanson on 09/11/24 at 16:38 EDT:    Common labs          12/6/2023    14:39   Common Labs   Glucose 75    BUN 11    Creatinine 1.37    Sodium 139    Potassium 4.3    Chloride 101    Calcium 9.9    Albumin 4.8    Total Bilirubin 0.6    Alkaline Phosphatase 74    AST (SGOT) 17    ALT (SGPT) 20    WBC 8.67    Hemoglobin 16.0    Hematocrit 48.6    Platelets 309    Total Cholesterol 207    Triglycerides 274    HDL Cholesterol 42    LDL Cholesterol  117        No Images in the past 120 days found..    Results                 Assessment and Plan    Diagnoses and all orders for this visit:    1. Right flank pain (Primary)  -     Lupus Diagnostic Profile; Future  -     CBC w AUTO Differential; Future  -     Uric acid; Future  -     Rheumatoid Factor; Future  -     C-reactive protein; Future  -     KIERSTEN; Future  -     Comprehensive metabolic panel; Future  -     Urinalysis With Culture If Indicated - Urine, Clean Catch; Future  -     Microalbumin / Creatinine Urine Ratio - Urine, Clean Catch; Future    2. Rib pain on right side  -     Lupus Diagnostic Profile; Future  -     CBC w AUTO Differential; Future  -     Uric acid; Future  -     Rheumatoid Factor; Future  -     C-reactive protein; Future  -     KIERSTEN; Future  -     Comprehensive metabolic panel; Future  -     Urinalysis With Culture If Indicated - Urine, Clean Catch; Future  -     Microalbumin / Creatinine Urine Ratio - Urine, Clean Catch; Future    3. Facial rash  -     Lupus Diagnostic Profile; Future  -     CBC w AUTO Differential; Future  -     Uric acid; Future  -     Rheumatoid Factor; Future  -     C-reactive protein; Future  -     KIERSTEN; Future  -     Comprehensive metabolic panel; Future  -     Urinalysis With Culture If Indicated - Urine, Clean Catch; Future  -     Microalbumin / Creatinine Urine Ratio - Urine, Clean Catch; Future  -     nystatin-triamcinolone  (MYCOLOG) 804900-8.1 UNIT/GM-% ointment; Apply 1 Application topically to the appropriate area as directed 2 (Two) Times a Day.  Dispense: 60 g; Refill: 0    4. Myalgia  -     Lupus Diagnostic Profile; Future  -     CBC w AUTO Differential; Future  -     Uric acid; Future  -     Rheumatoid Factor; Future  -     C-reactive protein; Future  -     KIERSTEN; Future  -     Comprehensive metabolic panel; Future  -     Urinalysis With Culture If Indicated - Urine, Clean Catch; Future  -     Microalbumin / Creatinine Urine Ratio - Urine, Clean Catch; Future    5. Muscle spasm  -     cyclobenzaprine (FLEXERIL) 10 MG tablet; Take 1 tablet by mouth 3 (Three) Times a Day As Needed for Muscle Spasms.  Dispense: 30 tablet; Refill: 0       Assessment & Plan  1. Right-sided abdominal pain.  The right-sided abdominal pain could be indicative of lupus, given its potential to affect the kidneys. Other possibilities such as a kidney stone, urinary tract infection, or costochondritis cannot be ruled out. Blood work and a urine test will be ordered to further investigate the cause of the pain. Cyclobenzaprine will be prescribed to manage the discomfort. He was informed that cyclobenzaprine might cause drowsiness, so caution is advised if he needs to work or drive.    2. Facial rash.  The facial rash could be a symptom of lupus, which often presents with redness on the nose and cheeks. Alternatively, it could be an allergic reaction. An antifungal steroid cream will be prescribed for application on the face until the rash clears up. He was informed about the potential for permanent discoloration with long-term use of the cream. The cream can also be used on similar rashes elsewhere if they appear.    3. Muscle cramps.  Muscle cramps in the stomach, side, and legs could be related to muscle spasms. Cyclobenzaprine will be prescribed to help alleviate these symptoms.    4. Health Maintenance.  Blood work and a urine test will be ordered to rule  out lupus and check kidney and liver function. He is advised to visit the outpatient lab in [place] at his convenience.            Smoking Cessation:    Subhash Barba  reports that he quit smoking about 5 years ago. His smoking use included cigarettes. He started smoking about 32 years ago. He has a 13.5 pack-year smoking history. He has never used smokeless tobacco.             Follow Up   Return in 1 month (on 10/11/2024), or if symptoms worsen or fail to improve, for Next scheduled follow up.  Patient was given instructions and counseling regarding his condition or for health maintenance advice. Please see specific information pulled into the AVS if appropriate.     Please note that portions of this note were completed with a voice recognition program.    Patient or patient representative verbalized consent for the use of Ambient Listening during the visit with  WENDY Hanson for chart documentation. 9/11/2024  16:38 EDT

## 2024-09-12 LAB — ANA SER QL: NEGATIVE

## 2024-09-21 LAB
ANA SER QL IF: NEGATIVE
C3 SERPL-MCNC: 226 MG/DL (ref 82–167)
C4 SERPL-MCNC: 39 MG/DL (ref 14–44)
CHROMATIN IGG SERPL-ACNC: <20 UNITS
DSDNA AB SER FARR-ACNC: <8 IU/ML
ENA SM AB SER-ACNC: <20 UNITS
ENA SS-A AB SER IA-ACNC: <20 UNITS
ENA SS-B AB SER IA-ACNC: <20 UNITS
U1 SNRNP AB SER QL: <20 UNITS

## 2024-09-27 DIAGNOSIS — R79.89 HIGH SERUM COMPLEMENT COMPONENT C3D: ICD-10-CM

## 2024-09-27 DIAGNOSIS — M79.10 MYALGIA: ICD-10-CM

## 2024-09-27 DIAGNOSIS — R21 FACIAL RASH: Primary | ICD-10-CM

## 2024-10-01 DIAGNOSIS — R10.84 GENERALIZED ABDOMINAL PAIN: ICD-10-CM

## 2024-10-01 DIAGNOSIS — R10.13 DYSPEPSIA: ICD-10-CM

## 2024-10-01 DIAGNOSIS — E78.2 MIXED HYPERLIPIDEMIA: Primary | ICD-10-CM

## 2024-10-03 ENCOUNTER — CLINICAL SUPPORT (OUTPATIENT)
Dept: FAMILY MEDICINE CLINIC | Facility: CLINIC | Age: 49
End: 2024-10-03
Payer: COMMERCIAL

## 2024-10-03 DIAGNOSIS — E78.2 MIXED HYPERLIPIDEMIA: ICD-10-CM

## 2024-10-03 DIAGNOSIS — R10.84 GENERALIZED ABDOMINAL PAIN: ICD-10-CM

## 2024-10-03 DIAGNOSIS — R10.13 DYSPEPSIA: ICD-10-CM

## 2024-10-03 LAB
AMYLASE SERPL-CCNC: 67 U/L (ref 28–100)
CHOLEST SERPL-MCNC: 141 MG/DL (ref 0–200)
HDLC SERPL-MCNC: 38 MG/DL (ref 40–60)
LDLC SERPL CALC-MCNC: 76 MG/DL (ref 0–100)
LDLC/HDLC SERPL: 1.88 {RATIO}
LIPASE SERPL-CCNC: 36 U/L (ref 13–60)
TRIGL SERPL-MCNC: 157 MG/DL (ref 0–150)
VLDLC SERPL-MCNC: 27 MG/DL (ref 5–40)

## 2024-10-03 PROCEDURE — 80061 LIPID PANEL: CPT

## 2024-10-03 PROCEDURE — 83690 ASSAY OF LIPASE: CPT

## 2024-10-03 PROCEDURE — 82150 ASSAY OF AMYLASE: CPT

## 2024-10-03 PROCEDURE — 36415 COLL VENOUS BLD VENIPUNCTURE: CPT

## 2024-10-08 ENCOUNTER — OFFICE VISIT (OUTPATIENT)
Dept: FAMILY MEDICINE CLINIC | Facility: CLINIC | Age: 49
End: 2024-10-08
Payer: COMMERCIAL

## 2024-10-08 VITALS
DIASTOLIC BLOOD PRESSURE: 78 MMHG | SYSTOLIC BLOOD PRESSURE: 110 MMHG | TEMPERATURE: 97.7 F | WEIGHT: 197 LBS | BODY MASS INDEX: 29.86 KG/M2 | HEART RATE: 86 BPM | HEIGHT: 68 IN | OXYGEN SATURATION: 96 %

## 2024-10-08 DIAGNOSIS — L73.9 FOLLICULITIS: ICD-10-CM

## 2024-10-08 DIAGNOSIS — R21 FACIAL RASH: Primary | ICD-10-CM

## 2024-10-08 DIAGNOSIS — Z12.11 COLON CANCER SCREENING: ICD-10-CM

## 2024-10-08 PROCEDURE — 1160F RVW MEDS BY RX/DR IN RCRD: CPT | Performed by: STUDENT IN AN ORGANIZED HEALTH CARE EDUCATION/TRAINING PROGRAM

## 2024-10-08 PROCEDURE — 1125F AMNT PAIN NOTED PAIN PRSNT: CPT | Performed by: STUDENT IN AN ORGANIZED HEALTH CARE EDUCATION/TRAINING PROGRAM

## 2024-10-08 PROCEDURE — 99214 OFFICE O/P EST MOD 30 MIN: CPT | Performed by: STUDENT IN AN ORGANIZED HEALTH CARE EDUCATION/TRAINING PROGRAM

## 2024-10-08 PROCEDURE — 1159F MED LIST DOCD IN RCRD: CPT | Performed by: STUDENT IN AN ORGANIZED HEALTH CARE EDUCATION/TRAINING PROGRAM

## 2024-10-08 RX ORDER — DOXYCYCLINE 100 MG/1
100 CAPSULE ORAL 2 TIMES DAILY
Qty: 10 CAPSULE | Refills: 0 | Status: SHIPPED | OUTPATIENT
Start: 2024-10-08 | End: 2024-10-13

## 2024-10-16 ENCOUNTER — HOSPITAL ENCOUNTER (OUTPATIENT)
Dept: ULTRASOUND IMAGING | Facility: HOSPITAL | Age: 49
Discharge: HOME | End: 2024-10-16
Payer: COMMERCIAL

## 2024-10-16 DIAGNOSIS — R10.13 DYSPEPSIA: ICD-10-CM

## 2024-10-16 DIAGNOSIS — R10.84 GENERALIZED ABDOMINAL PAIN: ICD-10-CM

## 2024-10-16 PROCEDURE — 76705 ECHO EXAM OF ABDOMEN: CPT

## 2024-10-22 DIAGNOSIS — J45.20 MILD INTERMITTENT ASTHMA WITHOUT COMPLICATION: ICD-10-CM

## 2024-10-22 RX ORDER — ALBUTEROL SULFATE 90 UG/1
2 INHALANT RESPIRATORY (INHALATION) EVERY 6 HOURS PRN
Qty: 18 G | Refills: 0 | Status: SHIPPED | OUTPATIENT
Start: 2024-10-22

## 2024-10-29 RX ORDER — ATORVASTATIN CALCIUM 40 MG/1
40 TABLET, FILM COATED ORAL
Qty: 90 TABLET | Refills: 1 | Status: SHIPPED | OUTPATIENT
Start: 2024-10-29

## 2024-11-06 ENCOUNTER — OFFICE VISIT (OUTPATIENT)
Dept: FAMILY MEDICINE CLINIC | Facility: CLINIC | Age: 49
End: 2024-11-06
Payer: COMMERCIAL

## 2024-11-06 VITALS
HEIGHT: 68 IN | DIASTOLIC BLOOD PRESSURE: 70 MMHG | BODY MASS INDEX: 28.51 KG/M2 | WEIGHT: 188.1 LBS | SYSTOLIC BLOOD PRESSURE: 112 MMHG | HEART RATE: 73 BPM | TEMPERATURE: 98.3 F | OXYGEN SATURATION: 97 %

## 2024-11-06 DIAGNOSIS — L73.9 FOLLICULITIS: ICD-10-CM

## 2024-11-06 DIAGNOSIS — R21 FACIAL RASH: Primary | ICD-10-CM

## 2024-11-06 PROCEDURE — 99213 OFFICE O/P EST LOW 20 MIN: CPT

## 2024-11-06 PROCEDURE — 1125F AMNT PAIN NOTED PAIN PRSNT: CPT

## 2024-11-06 RX ORDER — SACCHAROMYCES BOULARDII 250 MG
250 CAPSULE ORAL 2 TIMES DAILY
Qty: 60 CAPSULE | Refills: 0 | Status: SHIPPED | OUTPATIENT
Start: 2024-11-06 | End: 2024-12-06

## 2024-11-06 RX ORDER — DOXYCYCLINE 100 MG/1
100 CAPSULE ORAL 2 TIMES DAILY
Qty: 20 CAPSULE | Refills: 0 | Status: SHIPPED | OUTPATIENT
Start: 2024-11-06 | End: 2024-11-16

## 2024-11-06 NOTE — PROGRESS NOTES
Chief Complaint  Chief Complaint   Patient presents with    Folliculitis     Pt seen on 2024 and 10/08/2024 for folliculitis of the face       Subjective      Subhash Barba presents to [facility]  History of Present Illness  The patient is a 49-year-old male who presents today to follow up on folliculitis of his face.    He was initially seen in 2024 and then followed up with Dr. Gibson on 10/08/2024, at which time he was prescribed doxycycline for treatment. He reports that his folliculitis had almost completely resolved after a 5-day course of doxycycline, but it began to recur once the medication was discontinued. His last dose of doxycycline was taken on 10/13/2024.    He has abstained from shaving and has not experienced any folliculitis on his neck. He also used a benzoyl peroxide face wash during his antibiotic treatment, which seemed to provide some relief. He notes that his skin had not been this clear in a long time.    He is interested in exploring other facial treatments that could be used in conjunction with antibiotics.      Objective     Medical History:  Past Medical History:   Diagnosis Date    Anxiety     Asthma     Broken bones     Head injuries     Hiatal hernia with gastroesophageal reflux 2021    Reflux esophagitis      No past surgical history on file.   Social History     Tobacco Use    Smoking status: Former     Current packs/day: 0.00     Average packs/day: 0.5 packs/day for 27.0 years (13.5 ttl pk-yrs)     Types: Cigarettes     Start date: 1992     Quit date: 2019     Years since quittin.8    Smokeless tobacco: Never   Vaping Use    Vaping status: Never Used   Substance Use Topics    Alcohol use: Never    Drug use: Never     Family History   Problem Relation Age of Onset    Diabetes Mother        Medications:  Prior to Admission medications    Medication Sig Start Date End Date Taking? Authorizing Provider   albuterol sulfate  " (90 Base) MCG/ACT inhaler inhale TWO puffs BY MOUTH EVERY 6 HOURS AS NEEDED for wheezing 10/22/24  Yes Yasmine Pitts APRN   atorvastatin (LIPITOR) 40 MG tablet Take 1 tablet by mouth every night at bedtime. 10/29/24  Yes Yasmine Pitts APRN   esomeprazole (nexIUM) 40 MG capsule Take 1 capsule by mouth Daily. 6/20/24  Yes Yasmine Pitts APRN   hydrOXYzine pamoate (VISTARIL) 50 MG capsule Take 1 capsule by mouth 3 times a day. 11/21/23  Yes Provider, MD Tcuker   sertraline (ZOLOFT) 50 MG tablet Take 1 tablet by mouth Daily. 11/21/23  Yes Provider, MD Tucker   cyclobenzaprine (FLEXERIL) 10 MG tablet Take 1 tablet by mouth 3 (Three) Times a Day As Needed for Muscle Spasms.  Patient not taking: Reported on 10/8/2024 9/11/24   Yasmine Pitts APRN   nystatin-triamcinolone (MYCOLOG) 784152-1.1 UNIT/GM-% ointment Apply 1 Application topically to the appropriate area as directed 2 (Two) Times a Day.  Patient not taking: Reported on 10/8/2024 9/11/24   Yasmine Pitts APRN   sildenafil (Viagra) 50 MG tablet Take 1 tablet by mouth Daily As Needed for Erectile Dysfunction for up to 90 days. 6/8/21 10/8/24  Marietta Garber APRN        Allergies:   Patient has no known allergies.    Health Maintenance Due   Topic Date Due    COLORECTAL CANCER SCREENING  Never done    Pneumococcal Vaccine 0-64 (1 of 2 - PCV) Never done    TDAP/TD VACCINES (1 - Tdap) Never done    COVID-19 Vaccine (1 - 2024-25 season) Never done         Vital Signs:   /70 (BP Location: Left arm, Patient Position: Sitting, Cuff Size: Adult)   Pulse 73   Temp 98.3 °F (36.8 °C) (Oral)   Ht 172.7 cm (68\")   Wt 85.3 kg (188 lb 1.6 oz)   SpO2 97%   BMI 28.60 kg/m²     Wt Readings from Last 3 Encounters:   11/06/24 85.3 kg (188 lb 1.6 oz)   10/08/24 89.4 kg (197 lb)   09/11/24 92.7 kg (204 lb 4.8 oz)     BP Readings from Last 3 Encounters:   11/06/24 112/70   10/08/24 110/78   09/11/24 126/78 "                Physical Exam  Vitals reviewed.   Constitutional:       Appearance: Normal appearance. He is well-developed.   HENT:      Head: Normocephalic and atraumatic.   Eyes:      Conjunctiva/sclera: Conjunctivae normal.      Pupils: Pupils are equal, round, and reactive to light.   Cardiovascular:      Rate and Rhythm: Normal rate.   Pulmonary:      Effort: Pulmonary effort is normal.   Skin:     General: Skin is warm and dry.      Findings: Rash present. Rash is macular and papular. Rash is not pustular or vesicular.      Comments: Facial rash with erythema   Neurological:      Mental Status: He is alert and oriented to person, place, and time.   Psychiatric:         Mood and Affect: Affect normal.       Physical Exam        Result Review :    The following data was reviewed by WENDY Hanson on 11/06/24 at 11:00 EST:        US Gallbladder    Result Date: 10/17/2024  Impression: Unremarkable right upper quadrant ultrasound Electronically Signed: Germain Portillo MD  10/17/2024 9:38 AM EDT  Workstation ID: [id number]      Results                 Assessment and Plan    Diagnoses and all orders for this visit:    1. Facial rash (Primary)  -     doxycycline (VIBRAMYCIN) 100 MG capsule; Take 1 capsule by mouth 2 (Two) Times a Day for 10 days.  Dispense: 20 capsule; Refill: 0  -     saccharomyces boulardii (Florastor) 250 MG capsule; Take 1 capsule by mouth 2 (Two) Times a Day for 30 days.  Dispense: 60 capsule; Refill: 0    2. Folliculitis  -     doxycycline (VIBRAMYCIN) 100 MG capsule; Take 1 capsule by mouth 2 (Two) Times a Day for 10 days.  Dispense: 20 capsule; Refill: 0  -     saccharomyces boulardii (Florastor) 250 MG capsule; Take 1 capsule by mouth 2 (Two) Times a Day for 30 days.  Dispense: 60 capsule; Refill: 0       Assessment & Plan  1. Facial folliculitis.  The patient's facial folliculitis initially improved with doxycycline but recurred after the prescription ran out. He was advised to  discontinue the use of benzoyl peroxide and instead use a gentle cleanser such as Cetaphil or CeraVe to reduce irritation. The doxycycline regimen was extended for an additional 10 days, to be taken twice daily. A probiotic was also prescribed to be taken concurrently with the antibiotic to protect gut health. He was encouraged to avoid shaving his face to help clear up the irritation. If there is no improvement, he is to return for further evaluation.            Smoking Cessation:    Subhash Barba  reports that he quit smoking about 5 years ago. His smoking use included cigarettes. He started smoking about 32 years ago. He has a 13.5 pack-year smoking history. He has never used smokeless tobacco.             Follow Up   Return if symptoms worsen or fail to improve.  Patient was given instructions and counseling regarding his condition or for health maintenance advice. Please see specific information pulled into the AVS if appropriate.     Please note that portions of this note were completed with a voice recognition program.    Patient or patient representative verbalized consent for the use of Ambient Listening during the visit with  WENDY Hanson for chart documentation. 11/6/2024  11:00 EST

## 2024-11-22 DIAGNOSIS — J45.20 MILD INTERMITTENT ASTHMA WITHOUT COMPLICATION: ICD-10-CM

## 2024-11-22 RX ORDER — ALBUTEROL SULFATE 90 UG/1
2 INHALANT RESPIRATORY (INHALATION) EVERY 6 HOURS PRN
Qty: 18 G | Refills: 2 | Status: SHIPPED | OUTPATIENT
Start: 2024-11-22

## 2024-12-18 DIAGNOSIS — K21.9 HIATAL HERNIA WITH GASTROESOPHAGEAL REFLUX: ICD-10-CM

## 2024-12-18 DIAGNOSIS — K44.9 HIATAL HERNIA WITH GASTROESOPHAGEAL REFLUX: ICD-10-CM

## 2024-12-18 RX ORDER — ESOMEPRAZOLE MAGNESIUM 40 MG/1
40 CAPSULE, DELAYED RELEASE ORAL DAILY
Qty: 30 CAPSULE | Refills: 5 | Status: SHIPPED | OUTPATIENT
Start: 2024-12-18

## 2025-02-03 ENCOUNTER — OFFICE VISIT (OUTPATIENT)
Dept: FAMILY MEDICINE CLINIC | Facility: CLINIC | Age: 50
End: 2025-02-03
Payer: COMMERCIAL

## 2025-02-03 VITALS
SYSTOLIC BLOOD PRESSURE: 140 MMHG | DIASTOLIC BLOOD PRESSURE: 90 MMHG | BODY MASS INDEX: 26.98 KG/M2 | HEART RATE: 91 BPM | WEIGHT: 178 LBS | HEIGHT: 68 IN | TEMPERATURE: 97.3 F | OXYGEN SATURATION: 96 %

## 2025-02-03 DIAGNOSIS — Z87.891 SMOKING HISTORY: ICD-10-CM

## 2025-02-03 DIAGNOSIS — R06.02 SHORTNESS OF BREATH: ICD-10-CM

## 2025-02-03 DIAGNOSIS — R07.9 CHEST PAIN, UNSPECIFIED TYPE: ICD-10-CM

## 2025-02-03 DIAGNOSIS — G47.00 FALLING ASLEEP AND WAKING UP TOO EARLY IN THE EVENING: ICD-10-CM

## 2025-02-03 DIAGNOSIS — R06.83 SNORING: ICD-10-CM

## 2025-02-03 DIAGNOSIS — Z00.00 ANNUAL PHYSICAL EXAM: Primary | ICD-10-CM

## 2025-02-03 LAB
ALBUMIN SERPL-MCNC: 4.2 G/DL (ref 3.5–5.2)
ALBUMIN/GLOB SERPL: 1.3 G/DL
ALP SERPL-CCNC: 101 U/L (ref 39–117)
ALT SERPL W P-5'-P-CCNC: 14 U/L (ref 1–41)
ANION GAP SERPL CALCULATED.3IONS-SCNC: 13 MMOL/L (ref 5–15)
AST SERPL-CCNC: 13 U/L (ref 1–40)
BASOPHILS # BLD AUTO: 0.07 10*3/MM3 (ref 0–0.2)
BASOPHILS NFR BLD AUTO: 0.7 % (ref 0–1.5)
BILIRUB SERPL-MCNC: 0.4 MG/DL (ref 0–1.2)
BUN SERPL-MCNC: 10 MG/DL (ref 6–20)
BUN/CREAT SERPL: 8.8 (ref 7–25)
CALCIUM SPEC-SCNC: 9.4 MG/DL (ref 8.6–10.5)
CHLORIDE SERPL-SCNC: 103 MMOL/L (ref 98–107)
CHOLEST SERPL-MCNC: 204 MG/DL (ref 0–200)
CO2 SERPL-SCNC: 24 MMOL/L (ref 22–29)
CREAT SERPL-MCNC: 1.13 MG/DL (ref 0.76–1.27)
DEPRECATED RDW RBC AUTO: 42.4 FL (ref 37–54)
EGFRCR SERPLBLD CKD-EPI 2021: 80.2 ML/MIN/1.73
EOSINOPHIL # BLD AUTO: 0.59 10*3/MM3 (ref 0–0.4)
EOSINOPHIL NFR BLD AUTO: 5.5 % (ref 0.3–6.2)
ERYTHROCYTE [DISTWIDTH] IN BLOOD BY AUTOMATED COUNT: 13.1 % (ref 12.3–15.4)
GLOBULIN UR ELPH-MCNC: 3.3 GM/DL
GLUCOSE SERPL-MCNC: 104 MG/DL (ref 65–99)
HCT VFR BLD AUTO: 49 % (ref 37.5–51)
HDLC SERPL-MCNC: 51 MG/DL (ref 40–60)
HGB BLD-MCNC: 15.6 G/DL (ref 13–17.7)
IMM GRANULOCYTES # BLD AUTO: 0.04 10*3/MM3 (ref 0–0.05)
IMM GRANULOCYTES NFR BLD AUTO: 0.4 % (ref 0–0.5)
LDLC SERPL CALC-MCNC: 114 MG/DL (ref 0–100)
LDLC/HDLC SERPL: 2.13 {RATIO}
LYMPHOCYTES # BLD AUTO: 1.6 10*3/MM3 (ref 0.7–3.1)
LYMPHOCYTES NFR BLD AUTO: 14.9 % (ref 19.6–45.3)
MCH RBC QN AUTO: 28.1 PG (ref 26.6–33)
MCHC RBC AUTO-ENTMCNC: 31.8 G/DL (ref 31.5–35.7)
MCV RBC AUTO: 88.1 FL (ref 79–97)
MONOCYTES # BLD AUTO: 0.78 10*3/MM3 (ref 0.1–0.9)
MONOCYTES NFR BLD AUTO: 7.3 % (ref 5–12)
NEUTROPHILS NFR BLD AUTO: 7.65 10*3/MM3 (ref 1.7–7)
NEUTROPHILS NFR BLD AUTO: 71.2 % (ref 42.7–76)
NRBC BLD AUTO-RTO: 0 /100 WBC (ref 0–0.2)
PLATELET # BLD AUTO: 281 10*3/MM3 (ref 140–450)
PMV BLD AUTO: 9.8 FL (ref 6–12)
POTASSIUM SERPL-SCNC: 4.2 MMOL/L (ref 3.5–5.2)
PROT SERPL-MCNC: 7.5 G/DL (ref 6–8.5)
RBC # BLD AUTO: 5.56 10*6/MM3 (ref 4.14–5.8)
SODIUM SERPL-SCNC: 140 MMOL/L (ref 136–145)
TRIGL SERPL-MCNC: 222 MG/DL (ref 0–150)
TSH SERPL DL<=0.05 MIU/L-ACNC: 1.56 UIU/ML (ref 0.27–4.2)
VLDLC SERPL-MCNC: 39 MG/DL (ref 5–40)
WBC NRBC COR # BLD AUTO: 10.73 10*3/MM3 (ref 3.4–10.8)

## 2025-02-03 PROCEDURE — 84443 ASSAY THYROID STIM HORMONE: CPT | Performed by: STUDENT IN AN ORGANIZED HEALTH CARE EDUCATION/TRAINING PROGRAM

## 2025-02-03 PROCEDURE — 1125F AMNT PAIN NOTED PAIN PRSNT: CPT | Performed by: STUDENT IN AN ORGANIZED HEALTH CARE EDUCATION/TRAINING PROGRAM

## 2025-02-03 PROCEDURE — 1160F RVW MEDS BY RX/DR IN RCRD: CPT | Performed by: STUDENT IN AN ORGANIZED HEALTH CARE EDUCATION/TRAINING PROGRAM

## 2025-02-03 PROCEDURE — 99396 PREV VISIT EST AGE 40-64: CPT | Performed by: STUDENT IN AN ORGANIZED HEALTH CARE EDUCATION/TRAINING PROGRAM

## 2025-02-03 PROCEDURE — 1159F MED LIST DOCD IN RCRD: CPT | Performed by: STUDENT IN AN ORGANIZED HEALTH CARE EDUCATION/TRAINING PROGRAM

## 2025-02-03 PROCEDURE — 85025 COMPLETE CBC W/AUTO DIFF WBC: CPT | Performed by: STUDENT IN AN ORGANIZED HEALTH CARE EDUCATION/TRAINING PROGRAM

## 2025-02-03 PROCEDURE — 80061 LIPID PANEL: CPT | Performed by: STUDENT IN AN ORGANIZED HEALTH CARE EDUCATION/TRAINING PROGRAM

## 2025-02-03 PROCEDURE — 80053 COMPREHEN METABOLIC PANEL: CPT | Performed by: STUDENT IN AN ORGANIZED HEALTH CARE EDUCATION/TRAINING PROGRAM

## 2025-02-03 RX ORDER — LORAZEPAM 1 MG/1
TABLET ORAL
COMMUNITY
Start: 2024-12-31

## 2025-02-03 RX ORDER — MIRTAZAPINE 15 MG/1
TABLET, FILM COATED ORAL
COMMUNITY
Start: 2025-01-27

## 2025-02-03 NOTE — PROGRESS NOTES
Chief Complaint  Palpitations (Pt stated that for the past few weeks he has been experiencing rapid heart beat. Only at night usually goes back down  )    Subjective          Subhash Barba presents to [facility]  Palpitations           History of Present Illness  The patient is a 49-year-old male who presents for evaluation of palpitations.    He reports experiencing sudden episodes of dyspnea, lasting approximately 2 to 3 seconds, during which he feels as though his breath has been forcibly taken away. He also describes awakenings at night due to tachycardia. He has not been evaluated for sleep apnea and does not recall any instances of gasping for air upon awakening. He does not experience shortness of breath or chest pain, although he admits to occasional chest discomfort. His sleep quality is generally good, with no difficulties in initiating sleep. He acknowledges the presence of snoring. He has a past history of smoking, having started at the age of 17 and continued intermittently for about 20 to 25 years before quitting 3 years ago. He has not consumed any food today. He has not received the pneumonia vaccine and expresses a general aversion to vaccinations. He has received the tetanus vaccine.    SOCIAL HISTORY  The patient quit smoking about 3 years ago after a 20-25 year history of off-and-on smoking.    FAMILY HISTORY  The patient's grandfather had a heart attack in his 60s.    IMMUNIZATIONS  The patient does not do vaccines.      Current Outpatient Medications   Medication Instructions    albuterol sulfate  (90 Base) MCG/ACT inhaler 2 puffs, Inhalation, Every 6 Hours PRN    atorvastatin (LIPITOR) 40 mg, Oral, Every Night at Bedtime    esomeprazole (NEXIUM) 40 mg, Oral, Daily    hydrOXYzine pamoate (VISTARIL) 50 MG capsule 1 capsule, 3 times daily    LORazepam (ATIVAN) 1 MG tablet take one tablet two times daily AS NEEDED FOR EXTREME ANXIETY    mirtazapine  "(REMERON) 15 MG tablet take one tablet by mouth once a day at bedtime    sertraline (ZOLOFT) 50 MG tablet 1 tablet, Daily    sildenafil (VIAGRA) 50 mg, Oral, Daily PRN       The following portions of the patient's history were reviewed and updated as appropriate: allergies, current medications, past family history, past medical history, past social history, past surgical history, and problem list.    Objective   Vital Signs:   /90   Pulse 91   Temp 97.3 °F (36.3 °C)   Ht 172.7 cm (68\")   Wt 80.7 kg (178 lb)   SpO2 96%   BMI 27.06 kg/m²     BP Readings from Last 3 Encounters:   02/03/25 140/90   11/06/24 112/70   10/08/24 110/78     Wt Readings from Last 3 Encounters:   02/03/25 80.7 kg (178 lb)   11/06/24 85.3 kg (188 lb 1.6 oz)   10/08/24 89.4 kg (197 lb)     BMI is >= 25 and <30. (Overweight) The following options were offered after discussion;: weight loss educational material (shared in after visit summary), exercise counseling/recommendations, and nutrition counseling/recommendations     Physical Exam  Constitutional:       General: He is not in acute distress.     Appearance: Normal appearance. He is not toxic-appearing.   HENT:      Head: Normocephalic and atraumatic.      Right Ear: Tympanic membrane normal.      Left Ear: Tympanic membrane normal.      Nose: Nose normal.      Mouth/Throat:      Mouth: Mucous membranes are moist.   Eyes:      General: No scleral icterus.     Extraocular Movements: Extraocular movements intact.      Conjunctiva/sclera: Conjunctivae normal.      Pupils: Pupils are equal, round, and reactive to light.   Cardiovascular:      Rate and Rhythm: Normal rate and regular rhythm.      Pulses: Normal pulses.      Heart sounds: Normal heart sounds. No murmur heard.     No gallop.   Pulmonary:      Effort: Pulmonary effort is normal. No respiratory distress.   Abdominal:      General: Abdomen is flat. Bowel sounds are normal. There is no distension.      Palpations: Abdomen is " soft.   Musculoskeletal:         General: Normal range of motion.      Cervical back: Normal range of motion.   Skin:     General: Skin is warm and dry.      Capillary Refill: Capillary refill takes less than 2 seconds.   Neurological:      General: No focal deficit present.      Mental Status: He is alert.   Psychiatric:         Mood and Affect: Mood normal.              Result Review :   The following data was reviewed by: Blessing Gibson MD on 02/03/2025:  Common labs          9/11/2024    15:29 9/11/2024    15:32 10/3/2024    09:46   Common Labs   Glucose 72      BUN 19      Creatinine 1.18      Sodium 138      Potassium 4.2      Chloride 99      Calcium 10.2      Albumin 5.0      Total Bilirubin 0.6      Alkaline Phosphatase 95      AST (SGOT) 16      ALT (SGPT) 13      WBC 7.58      Hemoglobin 16.0      Hematocrit 47.2      Platelets 321      Total Cholesterol   141    Triglycerides   157    HDL Cholesterol   38    LDL Cholesterol    76    Microalbumin, Urine  1.6     Uric Acid 9.5               Lab Results   Component Value Date    INR 1.02 (L) 08/10/2019    BILIRUBINUR Negative 09/11/2024       Procedures        Assessment and Plan    Diagnoses and all orders for this visit:    1. Annual physical exam (Primary)  -     Comprehensive Metabolic Panel  -     CBC & Differential  -     TSH  -     Lipid Panel    2. Chest pain, unspecified type  -     Treadmill Stress Test; Future  -     Adult Transthoracic Echo Complete W/ Cont if Necessary Per Protocol; Future    3. Falling asleep and waking up too early in the evening    4. Snoring  -     Ambulatory Referral to Sleep Medicine    5. Shortness of breath    6. Smoking history      The 10-year ASCVD risk score (Mg BETHEA, et al., 2019) is: 2.6%    Values used to calculate the score:      Age: 49 years      Sex: Male      Is Non- : No      Diabetic: No      Tobacco smoker: No      Systolic Blood Pressure: 140 mmHg      Is BP treated: No      HDL  Cholesterol: 38 mg/dL      Total Cholesterol: 141 mg/dL     Assessment & Plan  1. Palpitations.  The etiology of the palpitations is likely attributable to sleep apnea, given the patient's history of nocturnal awakenings with tachycardia. His ASCVD risk score is currently 2.6 percent, which does not account for his smoking history. His cholesterol levels, although slightly elevated, are not as high as one would anticipate considering his past smoking habit. A comprehensive cardiac workup will be initiated, including a stress test and an echocardiogram. A referral to a sleep medicine specialist will be made for further evaluation. Laboratory tests will be ordered to assess his cholesterol levels. He is advised to continue abstaining from smoking.      There are no discontinued medications.     I spent 31 minutes caring for Subhash on this date of service. This time includes time spent by me in the following activities:preparing for the visit, reviewing tests, obtaining and/or reviewing a separately obtained history, performing a medically appropriate examination and/or evaluation , counseling and educating the patient/family/caregiver, and ordering medications, tests, or procedures  Follow Up   No follow-ups on file.  Patient was given instructions and counseling regarding his condition or for health maintenance advice. Please see specific information pulled into the AVS if appropriate.       Blessing Gibson MD  02/03/25  10:22 EST      Patient or patient representative verbalized consent for the use of Ambient Listening during the visit with  Blessing Gibson MD for chart documentation. 2/3/2025  10:18 EST

## 2025-03-04 ENCOUNTER — OFFICE VISIT (OUTPATIENT)
Dept: FAMILY MEDICINE CLINIC | Facility: CLINIC | Age: 50
End: 2025-03-04
Payer: COMMERCIAL

## 2025-03-04 VITALS
DIASTOLIC BLOOD PRESSURE: 80 MMHG | OXYGEN SATURATION: 97 % | SYSTOLIC BLOOD PRESSURE: 124 MMHG | HEIGHT: 68 IN | TEMPERATURE: 98.7 F | WEIGHT: 191.3 LBS | BODY MASS INDEX: 28.99 KG/M2 | HEART RATE: 88 BPM

## 2025-03-04 DIAGNOSIS — F43.21 GRIEF: ICD-10-CM

## 2025-03-04 DIAGNOSIS — F41.9 ANXIETY AND DEPRESSION: ICD-10-CM

## 2025-03-04 DIAGNOSIS — F32.A ANXIETY AND DEPRESSION: ICD-10-CM

## 2025-03-04 DIAGNOSIS — F41.0 PANIC ATTACKS: ICD-10-CM

## 2025-03-04 DIAGNOSIS — E78.2 MIXED HYPERLIPIDEMIA: Primary | ICD-10-CM

## 2025-03-04 NOTE — PROGRESS NOTES
Chief Complaint  Chief Complaint   Patient presents with    Hyperlipidemia     Pt here to discuss recent lab results, elevated cholesterol levels.       Subjective      Subhash Barba presents to [facility]  History of Present Illness  The patient is a 49-year-old male who presents today to follow up on high cholesterol levels. He was seen by Dr. Gibson in the clinic last month for an annual physical exam. Cholesterol levels were elevated, but he says he was not fasting. He also was experiencing palpitations at the time, so he was referred to sleep medicine and cardiology for a stress test and echocardiogram.    He reports experiencing palpitations, which prompted his visit. These palpitations occur sporadically, approximately once or twice daily. He has no history of cardiac issues. He has a stress test scheduled for the day after tomorrow. He underwent an echocardiogram last year and is considering whether a repeat test is necessary.    He has been under the care of a psychologist, Dora Morris, since his divorce. Following the death of his son, his Zoloft dosage was increased, and lorazepam was prescribed for severe episodes. He has taken lorazepam a few times and found it beneficial. He also takes Vistaril as needed. He sees his psychologist every 2 weeks and is starting therapy once a week.    He has discontinued his exercise regimen and healthy eating habits, resulting in a weight increase from 164 pounds to 190 pounds. His medications remain unchanged.    He has an upcoming appointment with sleep medicine due to symptoms of snoring and episodes of waking up with a racing heart.      MEDICATIONS  Current: atorvastatin, Zoloft, lorazepam, hydroxyzine      Objective     Medical History:  Past Medical History:   Diagnosis Date    Allergic     Seasonal    Anxiety     Asthma     Broken bones     Depression     Erectile dysfunction     Head injuries     Hiatal hernia with  gastroesophageal reflux 2021    Hyperlipidemia     Reflux esophagitis      History reviewed. No pertinent surgical history.   Social History     Tobacco Use    Smoking status: Former     Current packs/day: 0.00     Average packs/day: 0.5 packs/day for 27.0 years (13.5 ttl pk-yrs)     Types: Cigarettes     Start date: 1992     Quit date: 2019     Years since quittin.1    Smokeless tobacco: Never    Tobacco comments:     It was periodically, not constant   Vaping Use    Vaping status: Never Used   Substance Use Topics    Alcohol use: Not Currently    Drug use: Never     Family History   Problem Relation Age of Onset    Diabetes Mother     Anxiety disorder Mother     Depression Mother     Hyperlipidemia Mother     Vision loss Mother         Glaucoma    Anxiety disorder Maternal Grandmother     Cancer Paternal Uncle         Esophagus    Early death Paternal Uncle         Passed at age 42 complications from cancer    Diabetes Brother     Hyperlipidemia Brother     Early death Son         Passed on 24       Medications:  Prior to Admission medications    Medication Sig Start Date End Date Taking? Authorizing Provider   albuterol sulfate  (90 Base) MCG/ACT inhaler Inhale 2 puffs Every 6 (Six) Hours As Needed for Wheezing. 24  Yes Yasmine Pitts APRN   atorvastatin (LIPITOR) 40 MG tablet Take 1 tablet by mouth every night at bedtime. 10/29/24  Yes Yasmine Pitts APRN   esomeprazole (nexIUM) 40 MG capsule Take 1 capsule by mouth Daily. 24  Yes Yasmine Pitts APRN   hydrOXYzine pamoate (VISTARIL) 50 MG capsule Take 1 capsule by mouth 3 times a day. 23  Yes Provider, MD Tucker   LORazepam (ATIVAN) 1 MG tablet  24  Yes Provider, Historical, MD   sertraline (ZOLOFT) 50 MG tablet Take 1 tablet by mouth 2 (Two) Times a Day.   Yes Provider, Historical, MD   mirtazapine (REMERON) 15 MG tablet take one tablet by mouth once a day at bedtime  Patient not  "taking: Reported on 2/3/2025 1/27/25   Provider, MD Tucker   sertraline (ZOLOFT) 50 MG tablet Take 1 tablet by mouth Daily. 11/21/23   Provider, MD Tucker   sildenafil (Viagra) 50 MG tablet Take 1 tablet by mouth Daily As Needed for Erectile Dysfunction for up to 90 days. 6/8/21 2/3/25  Marietta Garber APRN        Allergies:   Patient has no known allergies.    Health Maintenance Due   Topic Date Due    Pneumococcal Vaccine 0-49 (1 of 2 - PCV) Never done    TDAP/TD VACCINES (1 - Tdap) Never done    COLORECTAL CANCER SCREENING  Never done    COVID-19 Vaccine (1 - 2024-25 season) Never done         Vital Signs:   /80 (BP Location: Left arm, Patient Position: Sitting, Cuff Size: Adult)   Pulse 88   Temp 98.7 °F (37.1 °C) (Oral)   Ht 172.7 cm (68\")   Wt 86.8 kg (191 lb 4.8 oz)   SpO2 97%   BMI 29.09 kg/m²     Wt Readings from Last 3 Encounters:   03/04/25 86.8 kg (191 lb 4.8 oz)   02/03/25 80.7 kg (178 lb)   11/06/24 85.3 kg (188 lb 1.6 oz)     BP Readings from Last 3 Encounters:   03/04/25 124/80   02/03/25 140/90   11/06/24 112/70            The 10-year ASCVD risk score (Mg DK, et al., 2019) is: 2.7%    Values used to calculate the score:      Age: 49 years      Sex: Male      Is Non- : No      Diabetic: No      Tobacco smoker: No      Systolic Blood Pressure: 124 mmHg      Is BP treated: No      HDL Cholesterol: 51 mg/dL      Total Cholesterol: 204 mg/dL      Physical Exam  Constitutional:       General: He is not in acute distress.     Appearance: Normal appearance. He is not toxic-appearing.   HENT:      Head: Normocephalic and atraumatic.      Right Ear: Tympanic membrane normal.      Left Ear: Tympanic membrane normal.      Nose: Nose normal.      Mouth/Throat:      Mouth: Mucous membranes are moist.   Eyes:      General: No scleral icterus.     Extraocular Movements: Extraocular movements intact.      Conjunctiva/sclera: Conjunctivae normal.      Pupils: " Pupils are equal, round, and reactive to light.   Cardiovascular:      Rate and Rhythm: Normal rate and regular rhythm.      Pulses: Normal pulses.      Heart sounds: Normal heart sounds. No murmur heard.     No gallop.   Pulmonary:      Effort: Pulmonary effort is normal. No respiratory distress.   Abdominal:      General: Abdomen is flat. Bowel sounds are normal. There is no distension.      Palpations: Abdomen is soft.   Musculoskeletal:         General: Normal range of motion.      Cervical back: Normal range of motion.   Skin:     General: Skin is warm and dry.      Capillary Refill: Capillary refill takes less than 2 seconds.   Neurological:      General: No focal deficit present.      Mental Status: He is alert.   Psychiatric:         Mood and Affect: Mood normal.       Physical Exam  Heart sounds normal.      Result Review :    The following data was reviewed by WENDY Hanson on 03/04/25 at 19:11 EST:    Common labs          9/11/2024    15:29 9/11/2024    15:32 10/3/2024    09:46 2/3/2025    10:37   Common Labs   Glucose 72    104    BUN 19    10    Creatinine 1.18    1.13    Sodium 138    140    Potassium 4.2    4.2    Chloride 99    103    Calcium 10.2    9.4    Albumin 5.0    4.2    Total Bilirubin 0.6    0.4    Alkaline Phosphatase 95    101    AST (SGOT) 16    13    ALT (SGPT) 13    14    WBC 7.58    10.73    Hemoglobin 16.0    15.6    Hematocrit 47.2    49.0    Platelets 321    281    Total Cholesterol   141  204    Triglycerides   157  222    HDL Cholesterol   38  51    LDL Cholesterol    76  114    Microalbumin, Urine  1.6      Uric Acid 9.5           No Images in the past 120 days found..    Results  Laboratory Studies  Total cholesterol and LDL cholesterol were elevated. Lymphocytes were low.    Imaging  Echocardiogram showed normal ejection fraction, mild hypertrophy of the left ventricle, and slight asymmetry and enlargement of the heart.               Assessment and  Plan    Diagnoses and all orders for this visit:    1. Mixed hyperlipidemia (Primary)    2. Anxiety and depression    3. Grief    4. Panic attacks       Assessment & Plan  1. Hypercholesterolemia.  His cholesterol levels were elevated during the last visit, but he was not fasting, and he has been under significant stress. His LDL cholesterol was 114, which is not alarming. He will continue his atorvastatin regimen. A re-evaluation of his cholesterol levels will be conducted in 3 months. If any abnormalities are detected in the upcoming tests, the treatment plan will be adjusted accordingly.    2. Palpitations.  He experiences palpitations likely related to stress and anxiety. His ejection fraction was normal last year, with mild hypertrophy of the left ventricle, which is not concerning. He will undergo a stress test and echocardiogram to ensure there are no changes. He is encouraged to use lorazepam as needed for extreme episodes.    3. Anxiety and depression.  He is currently seeing a psychologist every 2 weeks and starting therapy once a week. His Zoloft dosage has been increased, and he has been prescribed lorazepam for extreme episodes. He is encouraged to continue these sessions and use lorazepam as needed.    4. Snoring and sleep disturbances.  He will have an initial consultation with sleep medicine tomorrow to address his snoring and episodes of waking up with a racing heart. A home sleep study may be considered based on the consultation.    Follow-up  The patient will follow up in 3 months.          Smoking Cessation:    Subhash Barba  reports that he quit smoking about 6 years ago. His smoking use included cigarettes. He started smoking about 33 years ago. He has a 13.5 pack-year smoking history. He has never used smokeless tobacco.           Follow Up   Return in about 3 months (around 6/4/2025) for Next scheduled follow up.  Patient was given instructions and counseling regarding his condition or  for health maintenance advice. Please see specific information pulled into the AVS if appropriate.     Please note that portions of this note were completed with a voice recognition program.    Patient or patient representative verbalized consent for the use of Ambient Listening during the visit with  WENDY Hanson for chart documentation. 3/4/2025  19:10 EST

## 2025-03-06 ENCOUNTER — HOSPITAL ENCOUNTER (OUTPATIENT)
Facility: HOSPITAL | Age: 50
Discharge: HOME | End: 2025-03-06
Admitting: STUDENT IN AN ORGANIZED HEALTH CARE EDUCATION/TRAINING PROGRAM
Payer: COMMERCIAL

## 2025-03-06 ENCOUNTER — HOSPITAL ENCOUNTER (OUTPATIENT)
Facility: HOSPITAL | Age: 50
Discharge: HOME | End: 2025-03-06
Payer: COMMERCIAL

## 2025-03-06 VITALS — BODY MASS INDEX: 28.79 KG/M2 | WEIGHT: 190 LBS | HEIGHT: 68 IN

## 2025-03-06 DIAGNOSIS — R07.9 CHEST PAIN, UNSPECIFIED TYPE: ICD-10-CM

## 2025-03-06 LAB
ASCENDING AORTA: 3 CM
AV MEAN PRESS GRAD SYS DOP V1V2: 3.3 MMHG
AV VMAX SYS DOP: 124.6 CM/SEC
BH CV ECHO MEAS - AO MAX PG: 6.2 MMHG
BH CV ECHO MEAS - AO ROOT DIAM: 3.2 CM
BH CV ECHO MEAS - AO V2 VTI: 22.8 CM
BH CV ECHO MEAS - AVA(I,D): 3 CM2
BH CV ECHO MEAS - EDV(MOD-SP2): 66.6 ML
BH CV ECHO MEAS - EDV(MOD-SP4): 66.8 ML
BH CV ECHO MEAS - EF(MOD-SP2): 53.2 %
BH CV ECHO MEAS - EF(MOD-SP4): 52.8 %
BH CV ECHO MEAS - ESV(MOD-SP2): 31.2 ML
BH CV ECHO MEAS - ESV(MOD-SP4): 31.5 ML
BH CV ECHO MEAS - IVS/LVPW: 0.62 CM
BH CV ECHO MEAS - IVSD: 0.8 CM
BH CV ECHO MEAS - LA DIMENSION: 3.2 CM
BH CV ECHO MEAS - LAT PEAK E' VEL: 10 CM/SEC
BH CV ECHO MEAS - LV DIASTOLIC VOL/BSA (35-75): 33.3 CM2
BH CV ECHO MEAS - LV MAX PG: 5.8 MMHG
BH CV ECHO MEAS - LV MEAN PG: 2.5 MMHG
BH CV ECHO MEAS - LV SYSTOLIC VOL/BSA (12-30): 15.7 CM2
BH CV ECHO MEAS - LV V1 MAX: 120 CM/SEC
BH CV ECHO MEAS - LV V1 VTI: 21.7 CM
BH CV ECHO MEAS - LVIDD: 3.8 CM
BH CV ECHO MEAS - LVIDS: 2.6 CM
BH CV ECHO MEAS - LVOT AREA: 3.1 CM2
BH CV ECHO MEAS - LVOT DIAM: 2 CM
BH CV ECHO MEAS - LVPWD: 1.3 CM
BH CV ECHO MEAS - MED PEAK E' VEL: 7.2 CM/SEC
BH CV ECHO MEAS - MV A MAX VEL: 79.8 CM/SEC
BH CV ECHO MEAS - MV E MAX VEL: 75 CM/SEC
BH CV ECHO MEAS - MV E/A: 0.94
BH CV ECHO MEAS - PA ACC TIME: 0.03 SEC
BH CV ECHO MEAS - PA V2 MAX: 125 CM/SEC
BH CV ECHO MEAS - RV MAX PG: 3.2 MMHG
BH CV ECHO MEAS - RV V1 MAX: 90 CM/SEC
BH CV ECHO MEAS - RV V1 VTI: 14.9 CM
BH CV ECHO MEAS - RVDD: 3 CM
BH CV ECHO MEAS - SV(LVOT): 68.1 ML
BH CV ECHO MEAS - SV(MOD-SP2): 35.4 ML
BH CV ECHO MEAS - SV(MOD-SP4): 35.3 ML
BH CV ECHO MEAS - SVI(LVOT): 34 ML/M2
BH CV ECHO MEAS - SVI(MOD-SP2): 17.6 ML/M2
BH CV ECHO MEAS - SVI(MOD-SP4): 17.6 ML/M2
BH CV ECHO MEASUREMENTS AVERAGE E/E' RATIO: 8.72
BH CV IMMEDIATE POST RECOVERY TECH DATA SYMPTOMS: NORMAL
BH CV IMMEDIATE POST TECH DATA BLOOD PRESSURE: NORMAL MMHG
BH CV IMMEDIATE POST TECH DATA HEART RATE: 140 BPM
BH CV IMMEDIATE POST TECH DATA OXYGEN SATS: 97 %
BH CV NINE MINUTE RECOVERY TECH DATA BLOOD PRESSURE: NORMAL MMHG
BH CV NINE MINUTE RECOVERY TECH DATA HEART RATE: 92 BPM
BH CV NINE MINUTE RECOVERY TECH DATA OXYGEN SATURATION: 96 %
BH CV NINE MINUTE RECOVERY TECH DATA SYMPTOMS: NORMAL
BH CV SIX MINUTE RECOVERY TECH DATA BLOOD PRESSURE: NORMAL
BH CV SIX MINUTE RECOVERY TECH DATA HEART RATE: 111 BPM
BH CV SIX MINUTE RECOVERY TECH DATA OXYGEN SATURATION: 96 %
BH CV SIX MINUTE RECOVERY TECH DATA SYMPTOMS: NORMAL
BH CV STRESS BP STAGE 1: NORMAL
BH CV STRESS BP STAGE 2: NORMAL
BH CV STRESS DURATION MIN STAGE 1: 3
BH CV STRESS DURATION MIN STAGE 2: 3
BH CV STRESS DURATION MIN STAGE 3: 1
BH CV STRESS DURATION SEC STAGE 1: 0
BH CV STRESS DURATION SEC STAGE 2: 0
BH CV STRESS DURATION SEC STAGE 3: 0
BH CV STRESS GRADE STAGE 1: 10
BH CV STRESS GRADE STAGE 2: 12
BH CV STRESS GRADE STAGE 3: 14
BH CV STRESS HR STAGE 1: 136
BH CV STRESS HR STAGE 2: 154
BH CV STRESS HR STAGE 3: 158
BH CV STRESS METS STAGE 1: 5
BH CV STRESS METS STAGE 2: 7.5
BH CV STRESS METS STAGE 3: 10
BH CV STRESS O2 STAGE 1: 96
BH CV STRESS O2 STAGE 2: 95
BH CV STRESS O2 STAGE 3: 95
BH CV STRESS PROTOCOL 1: NORMAL
BH CV STRESS RECOVERY BP: NORMAL MMHG
BH CV STRESS RECOVERY HR: 92 BPM
BH CV STRESS RECOVERY O2: 96 %
BH CV STRESS SPEED STAGE 1: 1.7
BH CV STRESS SPEED STAGE 2: 2.5
BH CV STRESS SPEED STAGE 3: 3.4
BH CV STRESS STAGE 1: 1
BH CV STRESS STAGE 2: 2
BH CV STRESS STAGE 3: 3
BH CV THREE MINUTE POST TECH DATA BLOOD PRESSURE: NORMAL MMHG
BH CV THREE MINUTE POST TECH DATA HEART RATE: 117 BPM
BH CV THREE MINUTE POST TECH DATA OXYGEN SATURATION: 97 %
BH CV THREE MINUTE RECOVERY TECH DATA SYMPTOM: NORMAL
IVRT: 69 MS
LEFT ATRIUM VOLUME INDEX: 19 ML/M2
MAXIMAL PREDICTED HEART RATE: 172 BPM
PERCENT MAX PREDICTED HR: 91.86 %
STRESS BASELINE BP: NORMAL MMHG
STRESS BASELINE HR: 108 BPM
STRESS O2 SAT REST: 96 %
STRESS PERCENT HR: 108 %
STRESS POST ESTIMATED WORKLOAD: 7.8 METS
STRESS POST EXERCISE DUR MIN: 6 MIN
STRESS POST EXERCISE DUR SEC: 30 SEC
STRESS POST O2 SAT PEAK: 95 %
STRESS POST PEAK BP: NORMAL MMHG
STRESS POST PEAK HR: 158 BPM
STRESS TARGET HR: 146 BPM

## 2025-03-06 PROCEDURE — 93017 CV STRESS TEST TRACING ONLY: CPT

## 2025-03-06 PROCEDURE — 93306 TTE W/DOPPLER COMPLETE: CPT

## 2025-03-13 LAB
BH CV IMMEDIATE POST RECOVERY TECH DATA SYMPTOMS: NORMAL
BH CV IMMEDIATE POST TECH DATA BLOOD PRESSURE: NORMAL MMHG
BH CV IMMEDIATE POST TECH DATA HEART RATE: 140 BPM
BH CV IMMEDIATE POST TECH DATA OXYGEN SATS: 97 %
BH CV NINE MINUTE RECOVERY TECH DATA BLOOD PRESSURE: NORMAL MMHG
BH CV NINE MINUTE RECOVERY TECH DATA HEART RATE: 92 BPM
BH CV NINE MINUTE RECOVERY TECH DATA OXYGEN SATURATION: 96 %
BH CV NINE MINUTE RECOVERY TECH DATA SYMPTOMS: NORMAL
BH CV SIX MINUTE RECOVERY TECH DATA BLOOD PRESSURE: NORMAL
BH CV SIX MINUTE RECOVERY TECH DATA HEART RATE: 111 BPM
BH CV SIX MINUTE RECOVERY TECH DATA OXYGEN SATURATION: 96 %
BH CV SIX MINUTE RECOVERY TECH DATA SYMPTOMS: NORMAL
BH CV STRESS BP STAGE 1: NORMAL
BH CV STRESS BP STAGE 2: NORMAL
BH CV STRESS DURATION MIN STAGE 1: 3
BH CV STRESS DURATION MIN STAGE 2: 3
BH CV STRESS DURATION MIN STAGE 3: 1
BH CV STRESS DURATION SEC STAGE 1: 0
BH CV STRESS DURATION SEC STAGE 2: 0
BH CV STRESS DURATION SEC STAGE 3: 0
BH CV STRESS GRADE STAGE 1: 10
BH CV STRESS GRADE STAGE 2: 12
BH CV STRESS GRADE STAGE 3: 14
BH CV STRESS HR STAGE 1: 136
BH CV STRESS HR STAGE 2: 154
BH CV STRESS HR STAGE 3: 158
BH CV STRESS METS STAGE 1: 5
BH CV STRESS METS STAGE 2: 7.5
BH CV STRESS METS STAGE 3: 10
BH CV STRESS O2 STAGE 1: 96
BH CV STRESS O2 STAGE 2: 95
BH CV STRESS O2 STAGE 3: 95
BH CV STRESS PROTOCOL 1: NORMAL
BH CV STRESS RECOVERY BP: NORMAL MMHG
BH CV STRESS RECOVERY HR: 92 BPM
BH CV STRESS RECOVERY O2: 96 %
BH CV STRESS SPEED STAGE 1: 1.7
BH CV STRESS SPEED STAGE 2: 2.5
BH CV STRESS SPEED STAGE 3: 3.4
BH CV STRESS STAGE 1: 1
BH CV STRESS STAGE 2: 2
BH CV STRESS STAGE 3: 3
BH CV THREE MINUTE POST TECH DATA BLOOD PRESSURE: NORMAL MMHG
BH CV THREE MINUTE POST TECH DATA HEART RATE: 117 BPM
BH CV THREE MINUTE POST TECH DATA OXYGEN SATURATION: 97 %
BH CV THREE MINUTE RECOVERY TECH DATA SYMPTOM: NORMAL
MAXIMAL PREDICTED HEART RATE: 172 BPM
PERCENT MAX PREDICTED HR: 91.86 %
STRESS BASELINE BP: NORMAL MMHG
STRESS BASELINE HR: 108 BPM
STRESS O2 SAT REST: 96 %
STRESS PERCENT HR: 108 %
STRESS POST ESTIMATED WORKLOAD: 7.8 METS
STRESS POST EXERCISE DUR MIN: 6 MIN
STRESS POST EXERCISE DUR SEC: 30 SEC
STRESS POST O2 SAT PEAK: 95 %
STRESS POST PEAK BP: NORMAL MMHG
STRESS POST PEAK HR: 158 BPM
STRESS TARGET HR: 146 BPM

## 2025-03-14 LAB
ASCENDING AORTA: 3 CM
AV MEAN PRESS GRAD SYS DOP V1V2: 3.3 MMHG
AV VMAX SYS DOP: 124.6 CM/SEC
BH CV ECHO MEAS - AO MAX PG: 6.2 MMHG
BH CV ECHO MEAS - AO ROOT DIAM: 3.2 CM
BH CV ECHO MEAS - AO V2 VTI: 22.8 CM
BH CV ECHO MEAS - AVA(I,D): 3 CM2
BH CV ECHO MEAS - EDV(MOD-SP2): 66.6 ML
BH CV ECHO MEAS - EDV(MOD-SP4): 66.8 ML
BH CV ECHO MEAS - EF(MOD-SP2): 53.2 %
BH CV ECHO MEAS - EF(MOD-SP4): 52.8 %
BH CV ECHO MEAS - ESV(MOD-SP2): 31.2 ML
BH CV ECHO MEAS - ESV(MOD-SP4): 31.5 ML
BH CV ECHO MEAS - IVS/LVPW: 0.62 CM
BH CV ECHO MEAS - IVSD: 0.8 CM
BH CV ECHO MEAS - LA DIMENSION: 3.2 CM
BH CV ECHO MEAS - LAT PEAK E' VEL: 10 CM/SEC
BH CV ECHO MEAS - LV DIASTOLIC VOL/BSA (35-75): 33.3 CM2
BH CV ECHO MEAS - LV MAX PG: 5.8 MMHG
BH CV ECHO MEAS - LV MEAN PG: 2.5 MMHG
BH CV ECHO MEAS - LV SYSTOLIC VOL/BSA (12-30): 15.7 CM2
BH CV ECHO MEAS - LV V1 MAX: 120 CM/SEC
BH CV ECHO MEAS - LV V1 VTI: 21.7 CM
BH CV ECHO MEAS - LVIDD: 3.8 CM
BH CV ECHO MEAS - LVIDS: 2.6 CM
BH CV ECHO MEAS - LVOT AREA: 3.1 CM2
BH CV ECHO MEAS - LVOT DIAM: 2 CM
BH CV ECHO MEAS - LVPWD: 1.3 CM
BH CV ECHO MEAS - MED PEAK E' VEL: 7.2 CM/SEC
BH CV ECHO MEAS - MV A MAX VEL: 79.8 CM/SEC
BH CV ECHO MEAS - MV E MAX VEL: 75 CM/SEC
BH CV ECHO MEAS - MV E/A: 0.94
BH CV ECHO MEAS - PA ACC TIME: 0.03 SEC
BH CV ECHO MEAS - PA V2 MAX: 125 CM/SEC
BH CV ECHO MEAS - RV MAX PG: 3.2 MMHG
BH CV ECHO MEAS - RV V1 MAX: 90 CM/SEC
BH CV ECHO MEAS - RV V1 VTI: 14.9 CM
BH CV ECHO MEAS - RVDD: 3 CM
BH CV ECHO MEAS - SV(LVOT): 68.1 ML
BH CV ECHO MEAS - SV(MOD-SP2): 35.4 ML
BH CV ECHO MEAS - SV(MOD-SP4): 35.3 ML
BH CV ECHO MEAS - SVI(LVOT): 34 ML/M2
BH CV ECHO MEAS - SVI(MOD-SP2): 17.6 ML/M2
BH CV ECHO MEAS - SVI(MOD-SP4): 17.6 ML/M2
BH CV ECHO MEASUREMENTS AVERAGE E/E' RATIO: 8.72
IVRT: 69 MS
LEFT ATRIUM VOLUME INDEX: 19 ML/M2

## 2025-03-19 ENCOUNTER — OFFICE VISIT (OUTPATIENT)
Dept: SLEEP MEDICINE | Facility: HOSPITAL | Age: 50
End: 2025-03-19
Payer: COMMERCIAL

## 2025-03-19 VITALS
HEIGHT: 68 IN | OXYGEN SATURATION: 96 % | BODY MASS INDEX: 29.93 KG/M2 | SYSTOLIC BLOOD PRESSURE: 130 MMHG | HEART RATE: 80 BPM | WEIGHT: 197.5 LBS | DIASTOLIC BLOOD PRESSURE: 92 MMHG

## 2025-03-19 DIAGNOSIS — R06.83 SNORING: Primary | ICD-10-CM

## 2025-03-19 DIAGNOSIS — G47.19 EXCESSIVE DAYTIME SLEEPINESS: ICD-10-CM

## 2025-03-19 PROCEDURE — G0463 HOSPITAL OUTPT CLINIC VISIT: HCPCS

## 2025-03-19 NOTE — PROGRESS NOTES
[facility] SLEEP MEDICINE   [address]  [place] [address]  [phone]     Referring physician/provider: Yasmine Pitts,*   PCP: Yasmine Pitts APRN    Type of service: Initial Sleep Medicine Consult.  Date of service: 3/19/2025        Sleep Clinic Initial Consult Visit      Patient or patient representative verbalized consent for the use of Ambient Listening during the visit with  Esequiel Barron DO for chart documentation. 3/19/2025  10:15 EDT    HISTORY OF PRESENT ILLNESS  Chief Complaint: nocturnal awakenings  Subhash Barba is a 49 y.o. male that was seen today, on 3/19/2025 at [facility].  History of Present Illness  He has a lifelong history of poor sleep quality, characterized by frequent awakenings at night. Since the death of his son in December 2024, he has been experiencing episodes of heart racing sensation upon awakening. He sleeps alone and has been informed by others that he snores loud, particularly when supine, but not when sleeping on his side or stomach. There have been no observed apneic episodes during sleep. He does not experience symptoms such as snoring, gasping, or choking with awakening. He reports daytime sleepiness, a symptom that has been present since his mid to late 20s, but does not take naps during the day. His sleep disturbances are more related to maintaining sleep rather than initiating it, with difficulty staying asleep every night. His bedtime routine involves going to bed around 10 PM and falling asleep within 30 to 45 minutes. The frequency of awakenings varies, ranging from 3 to 4 times to 10 to 12 times per night for uncertain reasons. He typically wakes up around 7 AM. He has no history of sleep testing. He has experienced sleep paralysis 3 to 4 times in his life, with the most recent episode occurring approximately 15 years ago. He reports no muscle weakness during laughter or  "excitement and does not experience hallucinations or sleepwalking. He has been informed that he engages in conversations during sleep. He has no known cardiac or pulmonary conditions. He recently underwent a stress test and echocardiogram recently. He is not aware of any family history of sleep apnea. He consumes a small amount of caffeine daily.  He is currently dealing with anxiety and depression following the sudden death of his 29-year-old son in December 2024. The cause of death remains undetermined despite an autopsy, and postmortem genetic testing is ongoing. He is under the care of a psychologist and is scheduled to start therapy next week. He also participates in an online support group for parents who have lost adult children.     Medical history  Hyperlipidemia  Anxiety  Depression  Asthma  Elevated blood pressure reading    SOCIAL HISTORY  He does not smoke anymore and does not consume alcohol. He drinks a little bit of caffeine, about a couple of drinks a day.    FAMILY HISTORY  He is not aware of any family history of sleep apnea.    History of Sleep Study before: no  ESS today: 8  Occupation:     Symptoms:   Have you ever awakened gasping for breath, coughing, choking:  []   Yes     [x]   No   Witnessed apneas: []   Yes     [x]   No   Loud Snoring: [x]   Yes     []   No   Do you drive a commercial vehicle:  []   Yes     [x]   No   History of any near accidents while driving due to sleepiness in the past 5 years: []   Yes     [x]   No       ROS:    Negative for:  Symptoms consistent with the clinical diagnosis of Restless legs syndrome  Symptoms consistent with the clinical diagnosis of Cataplexy   Sleep walking   See scanned media document for other sleep related questions      Allergies: Patient has no known allergies.       Objective   Vital Signs:   Vitals:    03/19/25 0900   BP: 130/92   Pulse: 80   SpO2: 96%   Weight: 89.6 kg (197 lb 8 oz)   Height: 172.7 cm (68\")   PainSc: 0-No pain "     Body mass index is 30.03 kg/m².      PHYSICAL EXAM  CONSTITUTIONAL:  Non-toxic, In no overt distress   ENT: Mallampati class 4  NECK:Neck Circumference: 15.5 inches  RESPIRATORY SYSTEM: Breathing appears nonlabored, no wheezes or rales  CARDIOVASULAR SYSTEM: Regular rate, no murmurs  NEUROLOGICAL SYSTEM: answers questions appropriately      Result Review   The following data was reviewed by: Esequiel Barron DO on 03/19/2025:  [x]  Medications reviewed        ASSESSMENT/PLAN  Diagnoses and all orders for this visit:    1. Snoring (Primary)  -     Home Sleep Study; Future    2. Excessive daytime sleepiness  -     Home Sleep Study; Future        He has loud snoring and daytime sleepiness and many nocturnal awakenings.  Testing ordered for MANAN.  I have discussed home sleep apnea testing (HSAT). HSAT ordered. Discussed if HSAT is negative or inconclusive will proceed with in lab sleep study.  Discussed recommendation for starting treatment with positive airway pressure if obstructive sleep apnea is present.   Patient is agreeable to starting treatment with PAP      Episodes of sleep paralysis over 15 years ago. He does not have cataplexy or hypnagogic hallucinations.    If he does not have MANAN we will further discuss CBT for insomnia.    Obesity class 1, patient's BMI is Body mass index is 30.03 kg/m².. I have discussed the relationship between weight and sleep apnea.There is direct correlation between weight and severity of sleep apnea.  Weight reduction is encouraged, as it may reduce the severity of sleep apnea.      I have also discussed with the patient the following  Untreated MANAN is associated with increased risks of stroke, heart attack, heart failure, motor vehicle accidents.   Recommended no driving or operating machinery if feeling sleepy. Discussed options of taking naps and getting rides with other people.   Generally most people need about 7 to 9 hours of sleep per night.       FOLLOW UP  Return for  Follow up after study, 31 to 90 days after PAP setup.  Patient was given instructions and counseling regarding his condition or for health maintenance advice. Please see specific information pulled into the AVS if appropriate.         Patient's questions were answered.  Thank you for allowing me to participate in the care of this patient.  Dictated Utilizing Dragon Dictation. Please note that portions of this note were completed with a voice recognition program. Part of this note may be an electronic transcription/translation of spoken language to printed text using the Dragon Dictation System.      [facility] SLEEP MEDICINE   Esequiel Barron DO  03/19/25  10:12 EDT

## 2025-03-20 ENCOUNTER — HOSPITAL ENCOUNTER (OUTPATIENT)
Dept: SLEEP MEDICINE | Facility: HOSPITAL | Age: 50
Discharge: HOME | End: 2025-03-20
Admitting: STUDENT IN AN ORGANIZED HEALTH CARE EDUCATION/TRAINING PROGRAM
Payer: COMMERCIAL

## 2025-03-20 DIAGNOSIS — G47.19 EXCESSIVE DAYTIME SLEEPINESS: ICD-10-CM

## 2025-03-20 DIAGNOSIS — R06.83 SNORING: ICD-10-CM

## 2025-03-20 PROCEDURE — G0399 HOME SLEEP TEST/TYPE 3 PORTA: HCPCS

## 2025-03-27 ENCOUNTER — TELEPHONE (OUTPATIENT)
Dept: SLEEP MEDICINE | Facility: HOSPITAL | Age: 50
End: 2025-03-27
Payer: COMMERCIAL

## 2025-03-27 DIAGNOSIS — G47.33 OSA (OBSTRUCTIVE SLEEP APNEA): Primary | ICD-10-CM

## 2025-04-02 ENCOUNTER — TELEPHONE (OUTPATIENT)
Dept: SLEEP MEDICINE | Facility: HOSPITAL | Age: 50
End: 2025-04-02
Payer: COMMERCIAL

## 2025-06-06 ENCOUNTER — OFFICE VISIT (OUTPATIENT)
Dept: FAMILY MEDICINE CLINIC | Facility: CLINIC | Age: 50
End: 2025-06-06
Payer: COMMERCIAL

## 2025-06-06 VITALS
TEMPERATURE: 98.6 F | DIASTOLIC BLOOD PRESSURE: 64 MMHG | HEART RATE: 81 BPM | WEIGHT: 193.2 LBS | SYSTOLIC BLOOD PRESSURE: 106 MMHG | HEIGHT: 68 IN | BODY MASS INDEX: 29.28 KG/M2 | OXYGEN SATURATION: 96 %

## 2025-06-06 DIAGNOSIS — G47.33 OBSTRUCTIVE SLEEP APNEA SYNDROME, MILD: ICD-10-CM

## 2025-06-06 DIAGNOSIS — F41.9 ANXIETY AND DEPRESSION: ICD-10-CM

## 2025-06-06 DIAGNOSIS — Z12.11 COLON CANCER SCREENING: ICD-10-CM

## 2025-06-06 DIAGNOSIS — E78.2 MIXED HYPERLIPIDEMIA: Primary | ICD-10-CM

## 2025-06-06 DIAGNOSIS — F32.A ANXIETY AND DEPRESSION: ICD-10-CM

## 2025-06-06 LAB
ALBUMIN SERPL-MCNC: 4.7 G/DL (ref 3.5–5.2)
ALBUMIN/GLOB SERPL: 1.6 G/DL
ALP SERPL-CCNC: 82 U/L (ref 39–117)
ALT SERPL W P-5'-P-CCNC: 13 U/L (ref 1–41)
ANION GAP SERPL CALCULATED.3IONS-SCNC: 12.4 MMOL/L (ref 5–15)
AST SERPL-CCNC: 18 U/L (ref 1–40)
BASOPHILS # BLD AUTO: 0.04 10*3/MM3 (ref 0–0.2)
BASOPHILS NFR BLD AUTO: 0.7 % (ref 0–1.5)
BILIRUB SERPL-MCNC: 0.5 MG/DL (ref 0–1.2)
BUN SERPL-MCNC: 8 MG/DL (ref 6–20)
BUN/CREAT SERPL: 6.1 (ref 7–25)
CALCIUM SPEC-SCNC: 9.6 MG/DL (ref 8.6–10.5)
CHLORIDE SERPL-SCNC: 101 MMOL/L (ref 98–107)
CHOLEST SERPL-MCNC: 157 MG/DL (ref 0–200)
CO2 SERPL-SCNC: 24.6 MMOL/L (ref 22–29)
CREAT SERPL-MCNC: 1.32 MG/DL (ref 0.76–1.27)
DEPRECATED RDW RBC AUTO: 40.6 FL (ref 37–54)
EGFRCR SERPLBLD CKD-EPI 2021: 66.1 ML/MIN/1.73
EOSINOPHIL # BLD AUTO: 0.28 10*3/MM3 (ref 0–0.4)
EOSINOPHIL NFR BLD AUTO: 5.2 % (ref 0.3–6.2)
ERYTHROCYTE [DISTWIDTH] IN BLOOD BY AUTOMATED COUNT: 13 % (ref 12.3–15.4)
GLOBULIN UR ELPH-MCNC: 3 GM/DL
GLUCOSE SERPL-MCNC: 80 MG/DL (ref 65–99)
HCT VFR BLD AUTO: 46.8 % (ref 37.5–51)
HDLC SERPL-MCNC: 36 MG/DL (ref 40–60)
HGB BLD-MCNC: 15.7 G/DL (ref 13–17.7)
IMM GRANULOCYTES # BLD AUTO: 0 10*3/MM3 (ref 0–0.05)
IMM GRANULOCYTES NFR BLD AUTO: 0 % (ref 0–0.5)
LDLC SERPL CALC-MCNC: 97 MG/DL (ref 0–100)
LDLC/HDLC SERPL: 2.62 {RATIO}
LYMPHOCYTES # BLD AUTO: 1.06 10*3/MM3 (ref 0.7–3.1)
LYMPHOCYTES NFR BLD AUTO: 19.7 % (ref 19.6–45.3)
MCH RBC QN AUTO: 28.6 PG (ref 26.6–33)
MCHC RBC AUTO-ENTMCNC: 33.5 G/DL (ref 31.5–35.7)
MCV RBC AUTO: 85.2 FL (ref 79–97)
MONOCYTES # BLD AUTO: 0.37 10*3/MM3 (ref 0.1–0.9)
MONOCYTES NFR BLD AUTO: 6.9 % (ref 5–12)
NEUTROPHILS NFR BLD AUTO: 3.64 10*3/MM3 (ref 1.7–7)
NEUTROPHILS NFR BLD AUTO: 67.5 % (ref 42.7–76)
NRBC BLD AUTO-RTO: 0 /100 WBC (ref 0–0.2)
PLATELET # BLD AUTO: 272 10*3/MM3 (ref 140–450)
PMV BLD AUTO: 9.3 FL (ref 6–12)
POTASSIUM SERPL-SCNC: 4.2 MMOL/L (ref 3.5–5.2)
PROT SERPL-MCNC: 7.7 G/DL (ref 6–8.5)
RBC # BLD AUTO: 5.49 10*6/MM3 (ref 4.14–5.8)
SODIUM SERPL-SCNC: 138 MMOL/L (ref 136–145)
TRIGL SERPL-MCNC: 134 MG/DL (ref 0–150)
VLDLC SERPL-MCNC: 24 MG/DL (ref 5–40)
WBC NRBC COR # BLD AUTO: 5.39 10*3/MM3 (ref 3.4–10.8)

## 2025-06-06 PROCEDURE — 80061 LIPID PANEL: CPT

## 2025-06-06 PROCEDURE — 85025 COMPLETE CBC W/AUTO DIFF WBC: CPT

## 2025-06-06 PROCEDURE — 80053 COMPREHEN METABOLIC PANEL: CPT

## 2025-06-06 NOTE — PROGRESS NOTES
Chief Complaint  Chief Complaint   Patient presents with    Hyperlipidemia    Anxiety    Depression       Subjective      Subhash Barba presents to [facility]  History of Present Illness  The patient is a 50-year-old male who presents today for a routine follow-up on hyperlipidemia, anxiety, depression, and sleep apnea.    He has recently undergone an echocardiogram, stress test, and sleep study, all of which yielded satisfactory results. The sleep study indicated mild obstructive sleep apnea, with oxygen levels dropping to 83 percent. Despite the recommendation for a CPAP machine, he declined its use. These tests were conducted due to symptoms of sleepiness, intermittent chest pain, and shortness of breath. He also reported episodes of waking up with a rapid heartbeat, which have since resolved. He attributes these symptoms to anxiety, particularly following the unexpected death of his son. His medication regimen remains unchanged.    He is currently under the care of a psychiatrist and therapist and is part of a support group. He has resumed physical exercise, which he believes has improved his mood.    He is on atorvastatin 40 mg for hyperlipidemia. His cholesterol was last checked in 02/2025 by Dr. Santiago, and no changes were made to his medication dosage. He acknowledges that his dietary habits have deteriorated since the passing of his son. He is fasting today and is requesting lab work.    He has not yet received the Cologuard test kit.    The 10-year ASCVD risk score (Mg BETHEA, et al., 2019) is: 2.3%    Values used to calculate the score:      Age: 50 years      Sex: Male      Is Non- : No      Diabetic: No      Tobacco smoker: No      Systolic Blood Pressure: 106 mmHg      Is BP treated: No      HDL Cholesterol: 51 mg/dL      Total Cholesterol: 204 mg/dL      Objective     Medical History:  Past Medical History:   Diagnosis Date    Allergic      Seasonal    Anxiety     Asthma     Broken bones     Depression     Erectile dysfunction     Head injuries     Hiatal hernia with gastroesophageal reflux 2021    Hyperlipidemia     Reflux esophagitis      History reviewed. No pertinent surgical history.   Social History     Tobacco Use    Smoking status: Former     Current packs/day: 0.00     Average packs/day: 0.5 packs/day for 27.0 years (13.5 ttl pk-yrs)     Types: Cigarettes     Start date: 1992     Quit date: 2019     Years since quittin.4    Smokeless tobacco: Never    Tobacco comments:     It was periodically, not constant   Vaping Use    Vaping status: Never Used   Substance Use Topics    Alcohol use: Not Currently    Drug use: Never     Family History   Problem Relation Age of Onset    Diabetes Mother     Anxiety disorder Mother     Depression Mother     Hyperlipidemia Mother     Vision loss Mother         Glaucoma    Anxiety disorder Maternal Grandmother     Cancer Paternal Uncle         Esophagus    Early death Paternal Uncle         Passed at age 42 complications from cancer    Diabetes Brother     Hyperlipidemia Brother     Early death Son         Passed on 24       Medications:  Prior to Admission medications    Medication Sig Start Date End Date Taking? Authorizing Provider   albuterol sulfate  (90 Base) MCG/ACT inhaler Inhale 2 puffs Every 6 (Six) Hours As Needed for Wheezing. 24  Yes Yasmine Pitts APRN   atorvastatin (LIPITOR) 40 MG tablet Take 1 tablet by mouth every night at bedtime. 10/29/24  Yes Yasmine Pitts APRN   esomeprazole (nexIUM) 40 MG capsule Take 1 capsule by mouth Daily. 24  Yes Yasmine Pitts APRN   hydrOXYzine pamoate (VISTARIL) 50 MG capsule Take 1 capsule by mouth 3 times a day. 23  Yes Provider, MD Tucker   LORazepam (ATIVAN) 1 MG tablet  24  Yes Provider, MD Tucker   sertraline (ZOLOFT) 50 MG tablet Take 1 tablet by mouth 2 (Two) Times a  "Day.   Yes Provider, MD Tucker   sildenafil (Viagra) 50 MG tablet Take 1 tablet by mouth Daily As Needed for Erectile Dysfunction for up to 90 days.  Patient taking differently: Take 1 tablet by mouth As Needed for Erectile Dysfunction. 6/8/21 3/19/25  Marietta Garber APRN        Allergies:   Patient has no known allergies.    Health Maintenance Due   Topic Date Due    Pneumococcal Vaccine 0-49 (1 of 2 - PCV) Never done    TDAP/TD VACCINES (1 - Tdap) Never done    COLORECTAL CANCER SCREENING  Never done    COVID-19 Vaccine (1 - 2024-25 season) Never done         Vital Signs:   /64 (BP Location: Left arm, Patient Position: Sitting, Cuff Size: Adult)   Pulse 81   Temp 98.6 °F (37 °C) (Oral)   Ht 172.7 cm (68\")   Wt 87.6 kg (193 lb 3.2 oz)   SpO2 96%   BMI 29.38 kg/m²     Wt Readings from Last 3 Encounters:   06/06/25 87.6 kg (193 lb 3.2 oz)   03/19/25 89.6 kg (197 lb 8 oz)   03/06/25 86.2 kg (190 lb)     BP Readings from Last 3 Encounters:   06/06/25 106/64   03/19/25 130/92   03/04/25 124/80       Physical Exam  Vitals reviewed.   Constitutional:       Appearance: Normal appearance. He is well-developed.   HENT:      Head: Normocephalic and atraumatic.   Eyes:      Conjunctiva/sclera: Conjunctivae normal.      Pupils: Pupils are equal, round, and reactive to light.   Cardiovascular:      Rate and Rhythm: Normal rate and regular rhythm.      Heart sounds: No murmur heard.     No friction rub. No gallop.   Pulmonary:      Effort: Pulmonary effort is normal.      Breath sounds: Normal breath sounds. No wheezing or rhonchi.   Abdominal:      General: Bowel sounds are normal. There is no distension.      Palpations: Abdomen is soft.      Tenderness: There is no abdominal tenderness.   Skin:     General: Skin is warm and dry.   Neurological:      Mental Status: He is alert and oriented to person, place, and time.      Cranial Nerves: No cranial nerve deficit.   Psychiatric:         Mood and Affect: " Mood and affect normal.         Behavior: Behavior normal.         Thought Content: Thought content normal.         Judgment: Judgment normal.       Physical Exam  Respiratory: Clear to auscultation, no wheezing, rales or rhonchi  Cardiovascular: Regular rate and rhythm, no murmurs, rubs, or gallops      Result Review :    The following data was reviewed by WENDY Hanson on 06/06/25 at 12:01 EDT:    Common labs          9/11/2024    15:29 9/11/2024    15:32 10/3/2024    09:46 2/3/2025    10:37   Common Labs   Glucose 72    104    BUN 19    10    Creatinine 1.18    1.13    Sodium 138    140    Potassium 4.2    4.2    Chloride 99    103    Calcium 10.2    9.4    Albumin 5.0    4.2    Total Bilirubin 0.6    0.4    Alkaline Phosphatase 95    101    AST (SGOT) 16    13    ALT (SGPT) 13    14    WBC 7.58    10.73    Hemoglobin 16.0    15.6    Hematocrit 47.2    49.0    Platelets 321    281    Total Cholesterol   141  204    Triglycerides   157  222    HDL Cholesterol   38  51    LDL Cholesterol    76  114    Microalbumin, Urine  1.6      Uric Acid 9.5           No Images in the past 120 days found..    Results  Imaging   - Echocardiogram: Normal ejection fraction, no valvular issues    Diagnostic Testing   - Stress test: Normal, no ischemia or lack of oxygen during stress and physical activity   - Sleep study: Mild obstructive sleep apnea, oxygen levels dropped to 83%               Assessment and Plan    Diagnoses and all orders for this visit:    1. Mixed hyperlipidemia (Primary)  -     CBC & Differential  -     Comprehensive Metabolic Panel  -     Lipid Panel    2. Anxiety and depression    3. Obstructive sleep apnea syndrome, mild  -     CBC & Differential    4. Colon cancer screening  -     Cologuard - Stool, Per Rectum; Future       Assessment & Plan  1. Hyperlipidemia.  - Currently on atorvastatin 40 mg.  - Cholesterol levels will be reassessed today.  - No changes in medication since last evaluation  in 02/2025.  - Labs ordered today to determine if adjustments to medication are necessary.    2. Anxiety.  - Reports resolution of rapid heartbeat and sleep disturbances.  - Symptoms likely related to anxiety following the unexpected passing of his son.  - Continues to see therapy, psychiatry, and participates in a support group.  - Resumed workouts, which have helped with mood and overall well-being.    3. Depression.  - Continues to see therapy, psychiatry, and participates in a support group.  - Resumed workouts, which have helped with mood and overall well-being.  - Blood pressure and heart rate are stable today.    4. Sleep apnea.  - Mild obstructive sleep apnea with oxygen level drop to 83%.  - Advised to use a CPAP machine but declined due to mild severity and potential insurance issues.  - Weight loss suggested as beneficial, though BMI is 29 and not morbidly obese.  - Echocardiogram and stress test results are normal, with no ischemia or valvular issues.          Smoking Cessation:    Subhash Barba  reports that he quit smoking about 6 years ago. His smoking use included cigarettes. He started smoking about 33 years ago. He has a 13.5 pack-year smoking history. He has never used smokeless tobacco.             Follow Up   Return in about 6 months (around 12/6/2025) for Next scheduled follow up.  Patient was given instructions and counseling regarding his condition or for health maintenance advice. Please see specific information pulled into the AVS if appropriate.     Please note that portions of this note were completed with a voice recognition program.    Patient or patient representative verbalized consent for the use of Ambient Listening during the visit with  WENDY Hanson for chart documentation. 6/6/2025  12:01 EDT

## 2025-06-13 DIAGNOSIS — K44.9 HIATAL HERNIA WITH GASTROESOPHAGEAL REFLUX: ICD-10-CM

## 2025-06-13 DIAGNOSIS — K21.9 HIATAL HERNIA WITH GASTROESOPHAGEAL REFLUX: ICD-10-CM

## 2025-06-14 DIAGNOSIS — K21.9 HIATAL HERNIA WITH GASTROESOPHAGEAL REFLUX: ICD-10-CM

## 2025-06-14 DIAGNOSIS — K44.9 HIATAL HERNIA WITH GASTROESOPHAGEAL REFLUX: ICD-10-CM

## 2025-06-16 RX ORDER — ESOMEPRAZOLE MAGNESIUM 40 MG/1
40 CAPSULE, DELAYED RELEASE ORAL DAILY
Qty: 30 CAPSULE | Refills: 5 | OUTPATIENT
Start: 2025-06-16

## 2025-06-16 RX ORDER — ESOMEPRAZOLE MAGNESIUM 40 MG/1
40 CAPSULE, DELAYED RELEASE ORAL DAILY
Qty: 30 CAPSULE | Refills: 5 | Status: SHIPPED | OUTPATIENT
Start: 2025-06-16